# Patient Record
Sex: MALE | Race: WHITE | NOT HISPANIC OR LATINO
[De-identification: names, ages, dates, MRNs, and addresses within clinical notes are randomized per-mention and may not be internally consistent; named-entity substitution may affect disease eponyms.]

---

## 2017-03-08 PROBLEM — Z00.00 ENCOUNTER FOR PREVENTIVE HEALTH EXAMINATION: Status: ACTIVE | Noted: 2017-03-08

## 2017-05-24 ENCOUNTER — APPOINTMENT (OUTPATIENT)
Dept: UROLOGY | Facility: CLINIC | Age: 27
End: 2017-05-24

## 2017-09-23 ENCOUNTER — EMERGENCY (EMERGENCY)
Facility: HOSPITAL | Age: 27
LOS: 0 days | Discharge: HOME | End: 2017-09-23
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
## 2017-09-23 DIAGNOSIS — S50.861A INSECT BITE (NONVENOMOUS) OF RIGHT FOREARM, INITIAL ENCOUNTER: ICD-10-CM
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
## 2017-09-23 DIAGNOSIS — Z88.8 ALLERGY STATUS TO OTHER DRUGS, MEDICAMENTS AND BIOLOGICAL SUBSTANCES STATUS: ICD-10-CM

## 2017-11-22 ENCOUNTER — TRANSCRIPTION ENCOUNTER (OUTPATIENT)
Age: 27
End: 2017-11-22

## 2019-02-15 ENCOUNTER — TRANSCRIPTION ENCOUNTER (OUTPATIENT)
Age: 29
End: 2019-02-15

## 2019-08-14 ENCOUNTER — INPATIENT (INPATIENT)
Facility: HOSPITAL | Age: 29
LOS: 1 days | Discharge: HOME | End: 2019-08-16
Attending: INTERNAL MEDICINE | Admitting: INTERNAL MEDICINE
Payer: OTHER GOVERNMENT

## 2019-08-14 VITALS
DIASTOLIC BLOOD PRESSURE: 91 MMHG | WEIGHT: 188.94 LBS | RESPIRATION RATE: 18 BRPM | TEMPERATURE: 98 F | SYSTOLIC BLOOD PRESSURE: 139 MMHG | OXYGEN SATURATION: 100 % | HEIGHT: 71 IN | HEART RATE: 83 BPM

## 2019-08-14 LAB
ALBUMIN SERPL ELPH-MCNC: 5 G/DL — SIGNIFICANT CHANGE UP (ref 3.5–5.2)
ALP SERPL-CCNC: 81 U/L — SIGNIFICANT CHANGE UP (ref 30–115)
ALT FLD-CCNC: 26 U/L — SIGNIFICANT CHANGE UP (ref 0–41)
ANION GAP SERPL CALC-SCNC: 14 MMOL/L — SIGNIFICANT CHANGE UP (ref 7–14)
APTT BLD: 35.1 SEC — SIGNIFICANT CHANGE UP (ref 27–39.2)
AST SERPL-CCNC: 24 U/L — SIGNIFICANT CHANGE UP (ref 0–41)
BASOPHILS # BLD AUTO: 0.1 K/UL — SIGNIFICANT CHANGE UP (ref 0–0.2)
BASOPHILS NFR BLD AUTO: 1 % — SIGNIFICANT CHANGE UP (ref 0–1)
BILIRUB SERPL-MCNC: 1 MG/DL — SIGNIFICANT CHANGE UP (ref 0.2–1.2)
BUN SERPL-MCNC: 17 MG/DL — SIGNIFICANT CHANGE UP (ref 10–20)
CALCIUM SERPL-MCNC: 9.8 MG/DL — SIGNIFICANT CHANGE UP (ref 8.5–10.1)
CHLORIDE SERPL-SCNC: 100 MMOL/L — SIGNIFICANT CHANGE UP (ref 98–110)
CHOLEST SERPL-MCNC: 208 MG/DL — HIGH (ref 100–200)
CO2 SERPL-SCNC: 23 MMOL/L — SIGNIFICANT CHANGE UP (ref 17–32)
CREAT SERPL-MCNC: 1.1 MG/DL — SIGNIFICANT CHANGE UP (ref 0.7–1.5)
EOSINOPHIL # BLD AUTO: 0.39 K/UL — SIGNIFICANT CHANGE UP (ref 0–0.7)
EOSINOPHIL NFR BLD AUTO: 3.7 % — SIGNIFICANT CHANGE UP (ref 0–8)
GLUCOSE SERPL-MCNC: 109 MG/DL — HIGH (ref 70–99)
HCT VFR BLD CALC: 45.9 % — SIGNIFICANT CHANGE UP (ref 42–52)
HDLC SERPL-MCNC: 72 MG/DL — SIGNIFICANT CHANGE UP
HGB BLD-MCNC: 15 G/DL — SIGNIFICANT CHANGE UP (ref 14–18)
IMM GRANULOCYTES NFR BLD AUTO: 0.3 % — SIGNIFICANT CHANGE UP (ref 0.1–0.3)
INR BLD: 1.07 RATIO — SIGNIFICANT CHANGE UP (ref 0.65–1.3)
LACTATE SERPL-SCNC: 1.3 MMOL/L — SIGNIFICANT CHANGE UP (ref 0.5–2.2)
LIPID PNL WITH DIRECT LDL SERPL: 127 MG/DL — SIGNIFICANT CHANGE UP (ref 4–129)
LYMPHOCYTES # BLD AUTO: 3.55 K/UL — HIGH (ref 1.2–3.4)
LYMPHOCYTES # BLD AUTO: 33.9 % — SIGNIFICANT CHANGE UP (ref 20.5–51.1)
MCHC RBC-ENTMCNC: 28 PG — SIGNIFICANT CHANGE UP (ref 27–31)
MCHC RBC-ENTMCNC: 32.7 G/DL — SIGNIFICANT CHANGE UP (ref 32–37)
MCV RBC AUTO: 85.6 FL — SIGNIFICANT CHANGE UP (ref 80–94)
MONOCYTES # BLD AUTO: 0.95 K/UL — HIGH (ref 0.1–0.6)
MONOCYTES NFR BLD AUTO: 9.1 % — SIGNIFICANT CHANGE UP (ref 1.7–9.3)
NEUTROPHILS # BLD AUTO: 5.44 K/UL — SIGNIFICANT CHANGE UP (ref 1.4–6.5)
NEUTROPHILS NFR BLD AUTO: 52 % — SIGNIFICANT CHANGE UP (ref 42.2–75.2)
NRBC # BLD: 0 /100 WBCS — SIGNIFICANT CHANGE UP (ref 0–0)
PLATELET # BLD AUTO: 251 K/UL — SIGNIFICANT CHANGE UP (ref 130–400)
POTASSIUM SERPL-MCNC: 4.1 MMOL/L — SIGNIFICANT CHANGE UP (ref 3.5–5)
POTASSIUM SERPL-SCNC: 4.1 MMOL/L — SIGNIFICANT CHANGE UP (ref 3.5–5)
PROT SERPL-MCNC: 7.6 G/DL — SIGNIFICANT CHANGE UP (ref 6–8)
PROTHROM AB SERPL-ACNC: 12.3 SEC — SIGNIFICANT CHANGE UP (ref 9.95–12.87)
RBC # BLD: 5.36 M/UL — SIGNIFICANT CHANGE UP (ref 4.7–6.1)
RBC # FLD: 12.6 % — SIGNIFICANT CHANGE UP (ref 11.5–14.5)
SODIUM SERPL-SCNC: 137 MMOL/L — SIGNIFICANT CHANGE UP (ref 135–146)
TOTAL CHOLESTEROL/HDL RATIO MEASUREMENT: 2.9 RATIO — LOW (ref 4–5.5)
TRIGL SERPL-MCNC: 83 MG/DL — SIGNIFICANT CHANGE UP (ref 10–149)
TROPONIN T SERPL-MCNC: <0.01 NG/ML — SIGNIFICANT CHANGE UP
WBC # BLD: 10.46 K/UL — SIGNIFICANT CHANGE UP (ref 4.8–10.8)
WBC # FLD AUTO: 10.46 K/UL — SIGNIFICANT CHANGE UP (ref 4.8–10.8)

## 2019-08-14 PROCEDURE — 70450 CT HEAD/BRAIN W/O DYE: CPT | Mod: 26,59

## 2019-08-14 PROCEDURE — 99053 MED SERV 10PM-8AM 24 HR FAC: CPT

## 2019-08-14 PROCEDURE — 99291 CRITICAL CARE FIRST HOUR: CPT

## 2019-08-14 PROCEDURE — 70496 CT ANGIOGRAPHY HEAD: CPT | Mod: 26

## 2019-08-14 PROCEDURE — 70498 CT ANGIOGRAPHY NECK: CPT | Mod: 26

## 2019-08-14 PROCEDURE — 93010 ELECTROCARDIOGRAM REPORT: CPT

## 2019-08-14 NOTE — ED ADULT TRIAGE NOTE - CHIEF COMPLAINT QUOTE
pt sts " I have been having neck pain for about 3 days and around 9: 30 PM I had numbness of my right arm and face and my speech was slurred". pt denies any numbness or weakness at this time. No slurred speech at this time. pt sts " I have been having neck pain for about 3 days and around 9: 30 PM I had numbness of my right arm and face and my speech was slurred". pt denies any numbness or weakness at this time. No slurred speech at this time. code stroke activated.

## 2019-08-14 NOTE — ED ADULT NURSE NOTE - CHIEF COMPLAINT QUOTE
pt sts " I have been having neck pain for about 3 days and around 9: 30 PM I had numbness of my right arm and face and my speech was slurred". pt denies any numbness or weakness at this time. No slurred speech at this time. code stroke activated.

## 2019-08-14 NOTE — ED PROVIDER NOTE - ATTENDING CONTRIBUTION TO CARE
29m w no PMH reports R sided facial/arm/torso/upper leg loss of sensation and difficulty finding words starting at 9:30p while at work. Numbness has since resolved but wife reports patient still seems occasionally slow to find words. Pt also reporting L sided neck pain for the past few days. No trauma. No use of blood thinners.    Review of Systems  Constitutional:  No fever or chills.   Eyes:  No diplopia, vision loss, eye pain, or discharge.  ENMT:  No nasal congestion, discharge, or throat pain.   Cardiac:  No chest pain, palpitations, syncope, or edema.  Respiratory:  No dyspnea, wheezing, or cough. No hemoptysis.  GI:  No vomiting, diarrhea, or abdominal pain. No melena or hematochezia.  :  No dysuria or hematuria.   Musculoskeletal:  No gait abnormality, joint swelling, joint pain, or back pain. +Neck pain  Skin:  No skin rash, pruritis, jaundice, or lesions.  Neuro:  No headache, dizziness, or focal weakness.  No change in mental status.     Physical Exam  General: Awake, alert, NAD, WDWN, non-toxic appearing, NCAT  Eyes: PERRL 3mm, EOMI, no icterus/nystagmus, lids and conjunctivae are normal  ENT: External inspection normal, pink/moist membranes, pharynx normal  CV: S1S2, regular rate and rhythm, no murmur/gallops/rubs, no JVD, 2+ pulses b/l, no edema/cords/homans, warm/well-perfused  Respiratory: Normal respiratory rate/effort, no respiratory distress, normal voice, speaking full sentences, lungs clear to auscultation b/l, no wheezing/rales/rhonchi, no retractions, no stridor  Abdomen: Soft abdomen, no tender/distended/guarding/rebound, no CVA tender  Musculoskeletal: FROM all 4 extremities, N/V intact  Neck: FROM neck, supple, no meningismus, trachea midline, no JVD, no cspine tender/step-offs  Integumentary: Color normal for race, warm and dry, no rash  Neuro: Oriented x3, no slurred speech, CN 2-12 intact, normal visual fields, 5/5 motor all 4 ext, normal sensory, no extinguishing, normal cerebellar  Psych: Oriented x3, mood normal, affect normal     29m w R sided numbness and difficulty speaking that is improving, starting 9:30p. Stroke code called on arrival. Also w L neck pain. --Labs, EKG, CXR, CT head, CT angio r/o dissection, will d/w Neuro, observe/re-assess. 29m w no PMH reports R sided facial/arm/torso/upper leg loss of sensation and difficulty finding words starting at 9:30p while at work. Numbness has since resolved but wife reports patient still seems occasionally slow to find words. Pt also reporting L sided neck pain for the past few days. No trauma. No use of blood thinners.    Review of Systems  Constitutional:  No fever or chills.   Eyes:  No diplopia, vision loss, eye pain, or discharge.  ENMT:  No nasal congestion, discharge, or throat pain.   Cardiac:  No chest pain, palpitations, syncope, or edema.  Respiratory:  No dyspnea, wheezing, or cough. No hemoptysis.  GI:  No vomiting, diarrhea, or abdominal pain. No melena or hematochezia.  :  No dysuria or hematuria.   Musculoskeletal:  No gait abnormality, joint swelling, joint pain, or back pain. +Neck pain  Skin:  No skin rash, pruritis, jaundice, or lesions.  Neuro:  No headache, dizziness, or focal weakness.  No change in mental status.     Physical Exam  General: Awake, alert, NAD, WDWN, non-toxic appearing, NCAT  Eyes: PERRL 3mm, EOMI, no icterus/nystagmus, lids and conjunctivae are normal  ENT: External inspection normal, pink/moist membranes, pharynx normal  CV: S1S2, regular rate and rhythm, no murmur/gallops/rubs, no JVD, 2+ pulses b/l, no edema/cords/homans, warm/well-perfused  Respiratory: Normal respiratory rate/effort, no respiratory distress, normal voice, speaking full sentences, lungs clear to auscultation b/l, no wheezing/rales/rhonchi, no retractions, no stridor  Abdomen: Soft abdomen, no tender/distended/guarding/rebound, no CVA tender  Musculoskeletal: FROM all 4 extremities, N/V intact  Neck: FROM neck, supple, no meningismus, trachea midline, no JVD, no cspine tender/step-offs, no bruit  Integumentary: Color normal for race, warm and dry, no rash  Neuro: Oriented x3, no slurred speech, CN 2-12 intact, normal visual fields, 5/5 motor all 4 ext, normal sensory, no extinguishing, normal cerebellar  Psych: Oriented x3, mood normal, affect normal     29m w R sided numbness and difficulty speaking that is improving, starting 9:30p. Stroke code called on arrival. Also w L neck pain. --Labs, EKG, CXR, CT head, CT angio r/o dissection, will d/w Neuro, observe/re-assess.

## 2019-08-14 NOTE — ED PROVIDER NOTE - PHYSICAL EXAMINATION
Vital Signs: I have reviewed the initial vital signs.  Constitutional: well-nourished, appears stated age, no acute distress  HEENT: left sided neck ttp along trapezius  Cardiovascular: regular rate, regular rhythm, well-perfused extremities  Respiratory: unlabored respiratory effort, clear to auscultation bilaterally  Gastrointestinal: soft, non-tender abdomen, no pulsatile mass  Musculoskeletal: supple neck, no lower extremity edema  Integumentary: warm, dry, no rash  Neurologic: awake, alert, cranial nerves II-XII grossly intact, extremities’ motor and sensory functions grossly intact. NIHSS 0  Psychiatric: appropriate mood, appropriate affect

## 2019-08-14 NOTE — ED PROVIDER NOTE - CLINICAL SUMMARY MEDICAL DECISION MAKING FREE TEXT BOX
29m w R sided numbness and difficulty speaking that is improving, starting 9:30p. Stroke code called on arrival. Also w L neck pain. Labs, EKG, & imaging reviewed. ASA& Lipitor given. No indication for TPA at this time. Patient admitted to Stroke Unit for Neuro eval & further care/management.

## 2019-08-14 NOTE — ED PROVIDER NOTE - OBJECTIVE STATEMENT
30 y/o m no PMH presents c/o slurred speech approx at 930 pm associated with right face, arm and leg numbness, resolved PTA. Pt believes episode lasted around 30 min. Wife states that he is still slow to respond to questions. denies any trauma, no n/v, no cough, congestion, admits to left sided neck pain for a few days.

## 2019-08-14 NOTE — ED PROVIDER NOTE - NS ED ROS FT
Constitutional: See HPI.  Eyes: No visual changes, eye pain or discharge. No Photophobia  ENMT: No hearing changes, pain, discharge or infections. No neck stiffness. No limited ROM  Cardiac: No SOB or edema. No chest pain with exertion.  Respiratory: No cough or respiratory distress. No hemoptysis. No history of asthma or RAD.  GI: No nausea, vomiting, diarrhea or abdominal pain.  : No dysuria, frequency or burning. No Discharge  MS: No myalgia, muscle weakness, joint pain or back pain.  Neuro: + numbness. No headache or weakness. No LOC.  Skin: No skin rash.  Except as documented in the HPI, all other systems are negative.

## 2019-08-14 NOTE — ED PROVIDER NOTE - CRITICAL CARE PROVIDED
interpretation of diagnostic studies/consultation with other physicians/direct patient care (not related to procedure)/consult w/ pt's family directly relating to pts condition/additional history taking direct patient care (not related to procedure)/interpretation of diagnostic studies/consultation with other physicians/additional history taking/documentation/consult w/ pt's family directly relating to pts condition

## 2019-08-15 ENCOUNTER — TRANSCRIPTION ENCOUNTER (OUTPATIENT)
Age: 29
End: 2019-08-15

## 2019-08-15 LAB
BLD GP AB SCN SERPL QL: SIGNIFICANT CHANGE UP
ESTIMATED AVERAGE GLUCOSE: 105 MG/DL — SIGNIFICANT CHANGE UP (ref 68–114)
HBA1C BLD-MCNC: 5.3 % — SIGNIFICANT CHANGE UP (ref 4–5.6)

## 2019-08-15 PROCEDURE — 70551 MRI BRAIN STEM W/O DYE: CPT | Mod: 26

## 2019-08-15 PROCEDURE — 71045 X-RAY EXAM CHEST 1 VIEW: CPT | Mod: 26

## 2019-08-15 PROCEDURE — 93306 TTE W/DOPPLER COMPLETE: CPT | Mod: 26

## 2019-08-15 PROCEDURE — 99253 IP/OBS CNSLTJ NEW/EST LOW 45: CPT

## 2019-08-15 PROCEDURE — 99223 1ST HOSP IP/OBS HIGH 75: CPT | Mod: AI

## 2019-08-15 PROCEDURE — 93970 EXTREMITY STUDY: CPT | Mod: 26

## 2019-08-15 RX ORDER — ATORVASTATIN CALCIUM 80 MG/1
40 TABLET, FILM COATED ORAL AT BEDTIME
Refills: 0 | Status: DISCONTINUED | OUTPATIENT
Start: 2019-08-15 | End: 2019-08-16

## 2019-08-15 RX ORDER — ENOXAPARIN SODIUM 100 MG/ML
40 INJECTION SUBCUTANEOUS DAILY
Refills: 0 | Status: DISCONTINUED | OUTPATIENT
Start: 2019-08-15 | End: 2019-08-16

## 2019-08-15 RX ORDER — ASPIRIN/CALCIUM CARB/MAGNESIUM 324 MG
1 TABLET ORAL
Qty: 0 | Refills: 0 | DISCHARGE
Start: 2019-08-15

## 2019-08-15 RX ORDER — ATORVASTATIN CALCIUM 80 MG/1
80 TABLET, FILM COATED ORAL ONCE
Refills: 0 | Status: COMPLETED | OUTPATIENT
Start: 2019-08-15 | End: 2019-08-15

## 2019-08-15 RX ORDER — ASPIRIN/CALCIUM CARB/MAGNESIUM 324 MG
81 TABLET ORAL DAILY
Refills: 0 | Status: DISCONTINUED | OUTPATIENT
Start: 2019-08-15 | End: 2019-08-16

## 2019-08-15 RX ORDER — CHLORHEXIDINE GLUCONATE 213 G/1000ML
1 SOLUTION TOPICAL
Refills: 0 | Status: DISCONTINUED | OUTPATIENT
Start: 2019-08-15 | End: 2019-08-16

## 2019-08-15 RX ORDER — ASPIRIN/CALCIUM CARB/MAGNESIUM 324 MG
325 TABLET ORAL ONCE
Refills: 0 | Status: COMPLETED | OUTPATIENT
Start: 2019-08-15 | End: 2019-08-15

## 2019-08-15 RX ADMIN — Medication 325 MILLIGRAM(S): at 01:21

## 2019-08-15 RX ADMIN — ATORVASTATIN CALCIUM 80 MILLIGRAM(S): 80 TABLET, FILM COATED ORAL at 01:21

## 2019-08-15 RX ADMIN — ENOXAPARIN SODIUM 40 MILLIGRAM(S): 100 INJECTION SUBCUTANEOUS at 12:38

## 2019-08-15 RX ADMIN — Medication 2 MILLIGRAM(S): at 11:00

## 2019-08-15 RX ADMIN — Medication 81 MILLIGRAM(S): at 12:38

## 2019-08-15 RX ADMIN — ATORVASTATIN CALCIUM 40 MILLIGRAM(S): 80 TABLET, FILM COATED ORAL at 21:29

## 2019-08-15 NOTE — DISCHARGE NOTE PROVIDER - NSDCFUADDAPPT_GEN_ALL_CORE_FT
Please call and make an appointment with your PMD and neurologist within a week after your discharge from hospital, follow up with them for health assessment and routine screening.

## 2019-08-15 NOTE — DISCHARGE NOTE PROVIDER - HOSPITAL COURSE
Mr. Kendrick is a 29y old male with no past medical history who presented to the ED with a chief complaint of change in speech, right-sided arm numbness, and left sided neck pain (15 Aug 2019 04:35). Patient stated he was sitting in the car when all of a sudden he had left sided neck pain with an associated right upper extremity numbness and tingling. Additionally he had stuttered speech and blurred vision at the time.  When the patient arrived in the ED, stroke code was called and was given a NIHSS =0, as his symptoms resolved prior to arrival.  There, he was given  mg and 80 mg Lipitor and transferred to the Neurology unit. Over the course of his  one-day hospital stay, several imaging studies were ordered such as CXR, echocardiogram, EEG, CT of head, CTA of head and neck with IV contrast, and MRI head and neck with no contrast.  All imaging studies were normal. After one hospital day, the patient was discharged and was asked to follow up with the Neurologist at [facility], Dr. Stewart, in two weeks for further workup. Mr. Kendrick is a 29y old male with no past medical history who presented to the ED with a chief complaint of change in speech, right-sided arm numbness, and left sided neck pain (15 Aug 2019 04:35). Patient stated he was sitting in the car when all of a sudden he had left sided neck pain with an associated right upper extremity numbness and tingling. Additionally he had stuttered speech and blurred vision at the time.  When the patient arrived in the ED, stroke code was called and was given a NIHSS =0, as his symptoms resolved prior to arrival.  There, he was given  mg and 80 mg Lipitor and transferred to the Neurology unit. Over the course of his  one-day hospital stay, several imaging studies were ordered such as CXR, echocardiogram, EEG, CT of head, CTA of head and neck with IV contrast, and MRI head and neck with no contrast.  All imaging studies were normal. Echp was positive for a small non patent PFO. After one hospital day, the patient was discharged and was asked to follow up with the Neurologist at [facility], Dr. Stewart, in two weeks for further workup.

## 2019-08-15 NOTE — CONSULT NOTE ADULT - SUBJECTIVE AND OBJECTIVE BOX
HPI:  30 y/o left handed man with no PMH presents c/o expressive aphasia approx 4 hours prior to presentation accompanied by right face, arm and leg numbness, all of which have resolved. Wife states that he is still slow to respond to questions.    PAST MEDICAL & SURGICAL HISTORY:  No pertinent past medical history  No significant past surgical history    Home Medications:  none    Vital Signs Last 24 Hrs  T(C): 36.9 (15 Aug 2019 00:00), Max: 37.1 (14 Aug 2019 23:10)  T(F): 98.5 (15 Aug 2019 00:00), Max: 98.7 (14 Aug 2019 23:10)  HR: 82 (15 Aug 2019 00:00) (80 - 92)  BP: 123/76 (15 Aug 2019 00:00) (119/76 - 139/91)  BP(mean): 94 (15 Aug 2019 00:00) (94 - 106)  RR: 18 (15 Aug 2019 00:00) (15 - 18)  SpO2: 99% (15 Aug 2019 00:00) (99% - 100%)    NIH Stroke Scale:   · NIH Stroke Scale: LOC	(0) Alert; keenly responsive	  · NIH Stroke Scale: LOC Question	(0) Answers both questions correctly	  · NIH Stroke Scale: LOC Command	(0) Performs both tasks correctly	  · NIH Stroke Scale: Gaze	(0) Normal	  · NIH Stroke Scale: Visual	(0) No visual loss	  · NIH Stroke Scale: Facial	(0) Normal symmetrical movements	  · NIH Stroke Scale: Arm Left	(0) No drift; limb holds 90 (or 45) degrees for full 10 secs	  · NIH Stroke Scale: Arm Right	(0) No drift; limb holds 90 (or 45) degrees for full 10 secs	  · NIH Stroke Scale: Leg Left	(0) No drift; leg holds 30 degree position for full 5 secs	  · NIH Stroke Scale: Leg Right	(0) No drift; leg holds 30 degree position for full 5 secs	  · NIH Stroke Scale: Ataxia	(0) Absent	  · NIH Stroke Scale: Sensory	(0) Normal; no sensory loss	  · NIH Stroke Scale: Language	(0) No aphasia; normal	  · NIH Stroke Scale: Dysarthria	(0) Normal	  · NIH Stroke Scale: Extinct Inattention	(0) No abnormality	  · NIH Stroke Scale: Total	0	    MRS: Zero    Allergies    No Known Allergies    Intolerances      MEDICATIONS  (STANDING):    MEDICATIONS  (PRN):      LABS:                        15.0   10.46 )-----------( 251      ( 14 Aug 2019 23:07 )             45.9     08-14    137  |  100  |  17  ----------------------------<  109<H>  4.1   |  23  |  1.1    Ca    9.8      14 Aug 2019 23:07    TPro  7.6  /  Alb  5.0  /  TBili  1.0  /  DBili  x   /  AST  24  /  ALT  26  /  AlkPhos  81  08-14    PT/INR - ( 14 Aug 2019 23:07 )   PT: 12.30 sec;   INR: 1.07 ratio         PTT - ( 14 Aug 2019 23:07 )  PTT:35.1 sec    < from: CT Angio Neck w/ IV Cont (08.14.19 @ 23:52) >    Procedure: Multiaxial CTA images of the neck and brain performed   following the administration of intravenous contrast. Coronal and   sagittal reformatted images as well as maximal intensity projection   images and 3-D reconstructions provided for comparison.    Findings:  Included aortic arch is unremarkable. Origins of the great vessels are   unremarkable.    Bilateral common carotid arteriesare normally patent. Region of the   bilateral carotid bifurcation is unremarkable. There is a normal   branching pattern of the bilateral ECA. Distal cervical internal carotid   arteries are normally patent.    M1 and M2 branches of the bilateral MCA as well as A1 and A2 branches of   the bilateral SONALI are normally patent.    Origins of the bilateral vertebral arteries are normally patent.   Vertebral arteries are normally patent. Basilar artery is normally   patent. Bilateral SCA and PCA vessels are normally visualized.    No focal stenosis or aneurysm identified at this time.    Mild mucosal thickening of the bilateral maxillary and right frontal   sinuses.    No acute osseous abnormality.    Impression:  Essentially unremarkable CTA of the neck and brain-no focal stenosis or   aneurysm identified at this time    Mild mucosal thickening of the bilateral maxillary and right frontal   sinuses.    < end of copied text >    < from: CT Brain Stroke Protocol (08.14.19 @ 23:30) >  The cortical sulci and ventricular system are  symmetrical and consistent   with the patient's age.      No acute intracranial hemorrhage. No mass effect, midline shift, or extra   axial  fluid collection.     Gray-white differentiation is maintained.     The bones are intact without depressed skull fracture.     Trace fluid in the right front sinus    IMPRESSION:  No CT evidence of acute intracranial pathology.    < end of copied text > HPI:  30 y/o left handed man with no PMH presents c/o expressive aphasia approx 4 hours prior to presentation accompanied by right face, arm and leg numbness, all of which have resolved. Pt believes episode lasted around 30 min. Wife states that he is still slow to respond to questions.    PAST MEDICAL & SURGICAL HISTORY:  No pertinent past medical history  No significant past surgical history    Home Medications:  none    Vital Signs Last 24 Hrs  T(C): 36.9 (15 Aug 2019 00:00), Max: 37.1 (14 Aug 2019 23:10)  T(F): 98.5 (15 Aug 2019 00:00), Max: 98.7 (14 Aug 2019 23:10)  HR: 82 (15 Aug 2019 00:00) (80 - 92)  BP: 123/76 (15 Aug 2019 00:00) (119/76 - 139/91)  BP(mean): 94 (15 Aug 2019 00:00) (94 - 106)  RR: 18 (15 Aug 2019 00:00) (15 - 18)  SpO2: 99% (15 Aug 2019 00:00) (99% - 100%)    NIH Stroke Scale:   · NIH Stroke Scale: LOC	(0) Alert; keenly responsive	  · NIH Stroke Scale: LOC Question	(0) Answers both questions correctly	  · NIH Stroke Scale: LOC Command	(0) Performs both tasks correctly	  · NIH Stroke Scale: Gaze	(0) Normal	  · NIH Stroke Scale: Visual	(0) No visual loss	  · NIH Stroke Scale: Facial	(0) Normal symmetrical movements	  · NIH Stroke Scale: Arm Left	(0) No drift; limb holds 90 (or 45) degrees for full 10 secs	  · NIH Stroke Scale: Arm Right	(0) No drift; limb holds 90 (or 45) degrees for full 10 secs	  · NIH Stroke Scale: Leg Left	(0) No drift; leg holds 30 degree position for full 5 secs	  · NIH Stroke Scale: Leg Right	(0) No drift; leg holds 30 degree position for full 5 secs	  · NIH Stroke Scale: Ataxia	(0) Absent	  · NIH Stroke Scale: Sensory	(0) Normal; no sensory loss	  · NIH Stroke Scale: Language	(0) No aphasia; normal	  · NIH Stroke Scale: Dysarthria	(0) Normal	  · NIH Stroke Scale: Extinct Inattention	(0) No abnormality	  · NIH Stroke Scale: Total	0	    ABCD2: 2  MRS: Zero    Allergies    No Known Allergies    Intolerances      MEDICATIONS  (STANDING):    MEDICATIONS  (PRN):      LABS:                        15.0   10.46 )-----------( 251      ( 14 Aug 2019 23:07 )             45.9     08-14    137  |  100  |  17  ----------------------------<  109<H>  4.1   |  23  |  1.1    Ca    9.8      14 Aug 2019 23:07    TPro  7.6  /  Alb  5.0  /  TBili  1.0  /  DBili  x   /  AST  24  /  ALT  26  /  AlkPhos  81  08-14    PT/INR - ( 14 Aug 2019 23:07 )   PT: 12.30 sec;   INR: 1.07 ratio         PTT - ( 14 Aug 2019 23:07 )  PTT:35.1 sec    < from: CT Angio Neck w/ IV Cont (08.14.19 @ 23:52) >    Procedure: Multiaxial CTA images of the neck and brain performed   following the administration of intravenous contrast. Coronal and   sagittal reformatted images as well as maximal intensity projection   images and 3-D reconstructions provided for comparison.    Findings:  Included aortic arch is unremarkable. Origins of the great vessels are   unremarkable.    Bilateral common carotid arteriesare normally patent. Region of the   bilateral carotid bifurcation is unremarkable. There is a normal   branching pattern of the bilateral ECA. Distal cervical internal carotid   arteries are normally patent.    M1 and M2 branches of the bilateral MCA as well as A1 and A2 branches of   the bilateral SONALI are normally patent.    Origins of the bilateral vertebral arteries are normally patent.   Vertebral arteries are normally patent. Basilar artery is normally   patent. Bilateral SCA and PCA vessels are normally visualized.    No focal stenosis or aneurysm identified at this time.    Mild mucosal thickening of the bilateral maxillary and right frontal   sinuses.    No acute osseous abnormality.    Impression:  Essentially unremarkable CTA of the neck and brain-no focal stenosis or   aneurysm identified at this time    Mild mucosal thickening of the bilateral maxillary and right frontal   sinuses.    < end of copied text >    < from: CT Brain Stroke Protocol (08.14.19 @ 23:30) >  The cortical sulci and ventricular system are  symmetrical and consistent   with the patient's age.      No acute intracranial hemorrhage. No mass effect, midline shift, or extra   axial  fluid collection.     Gray-white differentiation is maintained.     The bones are intact without depressed skull fracture.     Trace fluid in the right front sinus    IMPRESSION:  No CT evidence of acute intracranial pathology.    < end of copied text >

## 2019-08-15 NOTE — DISCHARGE NOTE PROVIDER - CARE PROVIDERS DIRECT ADDRESSES
,DirectAddress_Unknown,[email or web address] ,[email or web address],DirectAddress_Unknown

## 2019-08-15 NOTE — H&P ADULT - NSHPLABSRESULTS_GEN_ALL_CORE
15.0   10.46 )-----------( 251      ( 14 Aug 2019 23:07 )             45.9   \  08-14    137  |  100  |  17  ----------------------------<  109<H>  4.1   |  23  |  1.1    Ca    9.8      14 Aug 2019 23:07    TPro  7.6  /  Alb  5.0  /  TBili  1.0  /  DBili  x   /  AST  24  /  ALT  26  /  AlkPhos  81  08-14    < from: CT Angio Neck w/ IV Cont (08.14.19 @ 23:52) >    ssentially unremarkable CTA of the neck and brain-no focal stenosis or   aneurysm identified at this time    Mild mucosal thickening of the bilateral maxillary and right frontal   sinuses.            < end of copied text >    < from: CT Angio Head w/ IV Cont (08.14.19 @ 23:52) >        < end of copied text >

## 2019-08-15 NOTE — DISCHARGE NOTE PROVIDER - PROVIDER TOKENS
FREE:[LAST:[Michael],FIRST:[Kristin BARRON],PHONE:[phone]],FAX:[(   )    -],ADDRESS:[Dr. Kristin Rodriguez MD  [address] [place] [address]  Phone: [phone]],FOLLOWUP:[2 weeks]],PROVIDER:[TOKEN:[[id number]],FOLLOWUP:[2 weeks]] PROVIDER:[TOKEN:[[id number]],FOLLOWUP:[2 weeks]],FREE:[LAST:[Michael],FIRST:[Kristin BARRON],PHONE:[phone]],FAX:[(   )    -],ADDRESS:[Dr. Kristin Rodriguez MD  [facility] [address], [place] [address]  Phone: [phone]],FOLLOWUP:[1 week]]

## 2019-08-15 NOTE — CONSULT NOTE ADULT - ASSESSMENT
Impression:  28 y/o left handed man with no PMH presents c/o expressive aphasia approx 4 hours prior to presentation accompanied by right face, arm and leg numbness, all of which have resolved.     Plan:  Admit to stroke unit, call code stroke for any increase in NIHSS  MRI brain without esme if no contraindication  2d echo  lipid panel  REEG   mg now x 1 then 81 mg QD  Lipitor 80 mg now then QD Impression:  30 y/o left handed man with no PMH presents c/o expressive aphasia approx 4 hours prior to presentation accompanied by right face, arm and leg numbness, all of which have resolved.     Plan:  Admit to stroke unit, call code stroke for any increase in NIHSS  MRI brain without esme if no contraindication; MRI neck T1 Fat suppresion   2d echo  lipid panel, hypercoagulable panel  REEG   mg now x 1 then 81 mg QD  Lipitor 80 mg now then QD

## 2019-08-15 NOTE — MEDICAL STUDENT PROGRESS NOTE(EDUCATION) - SUBJECTIVE AND OBJECTIVE BOX
MEG ZULETA  29y  Male      Patient is a 29y old male with no PMH who presents with a chief complaint of change in speech, right-sided arm numbness, and left sided neck pain (15 Aug 2019 04:35). Patient stated he was sitting in his car last night () at 9:30pm when all of a sudden he felt left-sided neck pain, starting at the base of his skull, with an associated numbness and tingling in his right arm.  Shortly after, he had trouble speaking and described this as "stuttering" and difficult getting his words out. Patient admits to having history of tingling and numbness with neck pain before, which he attributed to his work vest possibly pinching a nerve; however he's never had the speech symptoms before.  Additionally patient admitted to difficulty swallowing in that he felt esophageal muscle spasms and that it caused discomfort but was not painful. Patient also admitted to having blurred vision at the time.  This episode lasted 30-60 mins and no symptoms on presentation to ED. Patient denies headaches, trauma, chest pain, SOB, LOC, nausea, vomiting, dizziness and loss bowel/bladder control. He denies any heavy lifting or strenuous activity and works as a  for 10 years. Patient denies any history of stroke, seizure, HTN, DM, DLD or heart disease.    In the ED, stroke code was called and received NIHSS =0.  Patient was given  mg    INTERVAL HPI/OVERNIGHT EVENTS:  No overnight events. Patient has no complaints and there are no symptoms.     REVIEW OF SYSTEMS:  CONSTITUTIONAL: No fever, weight loss, or fatigue  EYES: denies visual disturbances, or discharge  ENMT:  No difficulty hearing, tinnitus, vertigo; No sinus or throat pain  NECK: admits to slight left pain, denies stiffness  RESPIRATORY:  admits to cough 1x day, denies wheezing, chills or hemoptysis; No shortness of breath  CARDIOVASCULAR: No chest pain, palpitations, dizziness,   GASTROINTESTINAL: No abdominal or epigastric pain. No diarrhea or constipation. No nausea, vomiting, or hematemesis. No melena or hematochezia.  GENITOURINARY: No incontinence  NEUROLOGICAL: No headaches, memory loss, loss of strength, numbness, or tremors  MUSCULOSKELETAL: No joint pain or swelling; No muscle, back, or extremity pain      Vital Signs Last 24 Hrs  T(C): 35.7 (15 Aug 2019 05:44), Max: 37.1 (14 Aug 2019 23:10)  T(F): 96.3 (15 Aug 2019 05:44), Max: 98.8 (15 Aug 2019 00:30)  HR: 73 (15 Aug 2019 11:00) (72 - 92)  BP: 126/78 (15 Aug 2019 11:00) (111/75 - 139/91)  BP(mean): 89 (15 Aug 2019 02:00) (89 - 106)  RR: 18 (15 Aug 2019 05:44) (15 - 18)  SpO2: 99% (15 Aug 2019 02:00) (99% - 100%)    PHYSICAL EXAM:  GENERAL: NAD, well-groomed, well-developed  HEAD:  Atraumatic, Normocephalic  EYES: EOMI, PERRLA, conjunctiva and sclera clear  ENMT: Moist mucous membranes, Good dentition, No lesions  NECK: Supple, No JVD, Normal thyroid  NERVOUS SYSTEM:  Alert & Oriented X3, Good concentration; Motor Strength 5/5 B/L upper and lower extremities; DTRs 2+ intact and symmetric  CHEST/LUNG: Clear to auscultation bilaterally; No rales, rhonchi, wheezing, or rubs  HEART: Regular rate and rhythm; No murmurs, rubs, or gallops  ABDOMEN: Soft, Nontender, Nondistended; Bowel sounds present  EXTREMITIES:  2+ Peripheral Pulses, No clubbing, cyanosis, or edema      Consultant(s) Notes Reviewed:  [x ] YES  [ ] NO  Care Discussed with Consultants/Other Providers [ x] YES  [ ] NO    LABS:                        15.0   10.46 )-----------( 251      ( 14 Aug 2019 23:07 )             45.9     08-14    137  |  100  |  17  ----------------------------<  109<H>  4.1   |  23  |  1.1    Ca    9.8      14 Aug 2019 23:07    TPro  7.6  /  Alb  5.0  /  TBili  1.0  /  DBili  x   /  AST  24  /  ALT  26  /  AlkPhos  81  08-14    PT/INR - ( 14 Aug 2019 23:07 )   PT: 12.30 sec;   INR: 1.07 ratio         PTT - ( 14 Aug 2019 23:07 )  PTT:35.1 sec      CAPILLARY BLOOD GLUCOSE  99 (15 Aug 2019 00:02)      POCT Blood Glucose.: 99 mg/dL (14 Aug 2019 23:04)      RADIOLOGY & ADDITIONAL TESTS:  CT angio: negative for stenosis  CT: head: negative  CXRAY: negative  HbA1C: pending  Lipid profile: cholesterol: 208 (elevated), T, HDL: 72, LDL:127  Imaging Personally Reviewed:  [ ] YES  [ ] NO MEG ZULETA  29y  Male      Patient is a 29y old male with no PMH who presents with a chief complaint of change in speech, right-sided arm numbness, and left sided neck pain (15 Aug 2019 04:35). Patient stated he was sitting in his car last night () at 9:30pm when all of a sudden he felt left-sided neck pain, starting at the base of his skull, with an associated numbness and tingling in his right arm.  Shortly after, he had trouble speaking and described this as "stuttering" and difficult getting his words out. Patient admits to having history of tingling and numbness with neck pain before, which he attributed to his work vest possibly pinching a nerve; however he's never had the speech symptoms before.  Additionally patient admitted to difficulty swallowing in that he felt esophageal muscle spasms and that it caused discomfort but was not painful. Patient also admitted to having blurred vision at the time.  This episode lasted 30-60 mins and no symptoms on presentation to ED. Patient denies headaches, trauma, chest pain, SOB, LOC, nausea, vomiting, dizziness and loss bowel/bladder control. He denies any heavy lifting or strenuous activity and works as a  for 10 years. Patient denies any history of stroke, seizure, HTN, DM, DLD or heart disease.    In the ED, stroke code was called and received NIHSS =0.  Patient was given  mg    INTERVAL HPI/OVERNIGHT EVENTS:  No overnight events. Patient has no complaints and there are no symptoms.     REVIEW OF SYSTEMS:  CONSTITUTIONAL: No fever, weight loss, or fatigue  EYES: denies visual disturbances, or discharge  ENMT:  No difficulty hearing, tinnitus, vertigo; No sinus or throat pain  NECK: admits to slight left pain, denies stiffness  RESPIRATORY:  admits to cough 1x day, denies wheezing, chills or hemoptysis; No shortness of breath  CARDIOVASCULAR: No chest pain, palpitations, dizziness,   GASTROINTESTINAL: No abdominal or epigastric pain. No diarrhea or constipation. No nausea, vomiting, or hematemesis. No melena or hematochezia.  GENITOURINARY: No incontinence  NEUROLOGICAL: No headaches, memory loss, loss of strength, numbness, or tremors  MUSCULOSKELETAL: No joint pain or swelling; No muscle, back, or extremity pain      Vital Signs Last 24 Hrs  T(C): 35.7 (15 Aug 2019 05:44), Max: 37.1 (14 Aug 2019 23:10)  T(F): 96.3 (15 Aug 2019 05:44), Max: 98.8 (15 Aug 2019 00:30)  HR: 73 (15 Aug 2019 11:00) (72 - 92)  BP: 126/78 (15 Aug 2019 11:00) (111/75 - 139/91)  BP(mean): 89 (15 Aug 2019 02:00) (89 - 106)  RR: 18 (15 Aug 2019 05:44) (15 - 18)  SpO2: 99% (15 Aug 2019 02:00) (99% - 100%)    PHYSICAL EXAM:  GENERAL: NAD, well-groomed, well-developed  HEAD:  Atraumatic, Normocephalic  EYES: EOMI, PERRLA, conjunctiva and sclera clear  ENMT: Moist mucous membranes, Good dentition, No lesions  NECK: Supple, No JVD, Normal thyroid  NERVOUS SYSTEM:  Alert & Oriented X3, Good concentration; Motor Strength 5/5 B/L upper and lower extremities; DTRs 2+ intact and symmetric  CHEST/LUNG: Clear to auscultation bilaterally; No rales, rhonchi, wheezing, or rubs  HEART: Regular rate and rhythm; No murmurs, rubs, or gallops  ABDOMEN: Soft, Nontender, Nondistended; Bowel sounds present  EXTREMITIES:  2+ Peripheral Pulses, No clubbing, cyanosis, or edema      Consultant(s) Notes Reviewed:  [x ] YES  [ ] NO  Care Discussed with Consultants/Other Providers [ x] YES  [ ] NO    LABS:                        15.0   10.46 )-----------( 251      ( 14 Aug 2019 23:07 )             45.9     08-14    137  |  100  |  17  ----------------------------<  109<H>  4.1   |  23  |  1.1    Ca    9.8      14 Aug 2019 23:07    TPro  7.6  /  Alb  5.0  /  TBili  1.0  /  DBili  x   /  AST  24  /  ALT  26  /  AlkPhos  81  08-14    PT/INR - ( 14 Aug 2019 23:07 )   PT: 12.30 sec;   INR: 1.07 ratio         PTT - ( 14 Aug 2019 23:07 )  PTT:35.1 sec      CAPILLARY BLOOD GLUCOSE  99 (15 Aug 2019 00:02)      POCT Blood Glucose.: 99 mg/dL (14 Aug 2019 23:04)      RADIOLOGY & ADDITIONAL TESTS:  CT angio: negative for stenosis  CT: head: negative  CXRAY: negative  HbA1C: pending  Lipid profile: cholesterol: 208 (elevated), T, HDL: 72, LDL:127    Imaging Personally Reviewed:  [ ] YES  [ ] NO

## 2019-08-15 NOTE — CONSULT NOTE ADULT - ATTENDING COMMENTS
Patient seen and examined and agree with above except as noted.  Patient had episode of difficulty expressing himself with right sided parestheisas.  Symptoms lasted about 30 minutes  Currently at baseline NIHSS 0, mrankin 0  NO trauma, Patient adopted and doesn't know family history  No smoking drinking or drug use    Plan as above

## 2019-08-15 NOTE — DISCHARGE NOTE PROVIDER - NSDCFUADDINST_GEN_ALL_CORE_FT
Diet, regular as tolerated.   Please call and make an appointment with your PMD and neurologist within a week after your discharge from hospital, follow up with them for health assessment and routine screening.

## 2019-08-15 NOTE — H&P ADULT - ATTENDING COMMENTS
29 y/m with no PMH presents with chief complain of difficulty speaking that started around 9 pm yesterday, describes as "stuttering and delayed speech" lasted for around 30-60 minutes, he also had numbness of the right side of the body, above the level of hip, no weakness, had feeling of soreness of throat but no swallowing difficulty, no focal weakness, felt his vision was somewhat changed during the episode but cannot tell what exactly, denies double vision, no facial deviation, no chest pain, belly pain, shortness of breath, no change in bowel or bladder habits, fever, cough, belly pain. This episode lasted around 30-60 minutes, no symptoms on presentation to ED. He denies any history of stroke or similar episode in the past, except for occasional tingling.    # Transient dysarthria and Right sided numbness:    Given the age and no significant PMH, less likely to be stroke. Family history unknown as he is adopted  WIll admit to stroke unit. Rule out stroke/TIA  CT angio: Negative for any stenosis, CT head: negative  Will get MRI brain, ECHO, Hba1c, lipid profile.  REEG to r/o seizure as cause  s/p asprin 325 and statin 80, c/w aspirin 81 and statin 40 mg    # Diet:  No difficulty swallowing  Regular diet    # Activity:  As tolerated    # DVT ppx:  lovenox    # Full code 29 y/m with no PMH presents with chief complain of difficulty speaking that started around 9 pm yesterday while watching soccer with friend, describes as "stuttering and delayed speech" lasted for around 30 minutes, he also had numbness of the right side of the body, above the level of hip, no weakness, had feeling of soreness of throat but no swallowing difficulty, no focal weakness, felt his vision was somewhat changed during the episode but cannot tell what exactly, denies double vision, no facial deviation, no chest pain, belly pain, shortness of breath, no change in bowel or bladder habits, fever, cough, belly pain. This episode lasted around 30minutes, no symptoms on presentation to ED. He denies any history of stroke or similar episode in the past, except for occasional tingling.    # Transient dysarthria and Right sided numbness: R/o CVA, r/o dissection  CT angio: Negative for any stenosis, CT head: negative  Will get MRI brain, ECHO, Hba1c, lipid profile.  REEG to r/o seizure as cause  s/p asprin 325 and statin 80, c/w aspirin 81 and statin 40 mg    # Diet:  No difficulty swallowing  Regular diet    # Activity:  As tolerated    # DVT ppx:  lovenox    # Full code 29 y/m with no PMH presents with chief complain of difficulty speaking that started around 9 pm yesterday while watching soccer with friend, describes as "stuttering and delayed speech" lasted for around 30 minutes, he also had numbness of the right side of the body, above the level of hip, no weakness, had feeling of soreness of throat but no swallowing difficulty, no focal weakness, felt his vision was somewhat changed during the episode but cannot tell what exactly, denies double vision, no facial deviation. This episode lasted around 30minutes, no symptoms on presentation to ED. He denies any history of stroke or similar episode in the past, except for occasional tingling.    Denies CP, SOB, N/V/D/C/AP, cough, F, chills, dizziness, new focal weakness, HA, dysuria, or urinary symptoms, blood in stool.    ROS: all systems unremarkable except as above.     Gen: NAD, AA0x3  HEENT: PERRLA, EOM, no LAD  CV: nl S1 S2  Resp: decreased BS b/l  GI: NT/ND/S +BS  MS: no c/c/e  Neuro: nonfocal    12 Lead ECG:   Ventricular Rate 81 BPM  Atrial Rate 81 BPM  Diagnosis Line Normal sinus rhythm  Normal ECG    # Transient dysarthria and Right sided numbness: R/o CVA, r/o dissection vs cervical pathology vs complicated migraine  CT angio: Negative for any stenosis, CT head: negative  Will get MRI brain, ECHO, Hba1c, lipid profile.  REEG to r/o seizure as cause  s/p aspirin 325 and statin 80, c/w aspirin 81 and statin 40 mg    # Diet:  No difficulty swallowing  Regular diet    # Activity:  As tolerated    # DVT ppx:  lovenox    # Full code    Addendum:  MRI negative for acute CVA, EEG negative for seizures. D/w neuro, if echo ok, may be d/c'ed home with outpt f/u. D/w pt's spouse. Verbalized understanding of instructions. Suspect complicated migraine as etiology of pt's symptoms. 29 y/m with no PMH presents with chief complain of difficulty speaking that started around 9 pm yesterday while watching soccer with friend, describes as "stuttering and delayed speech" lasted for around 30 minutes, he also had numbness of the right side of the body, above the level of hip, no weakness, had feeling of soreness of throat but no swallowing difficulty, no focal weakness, felt his vision was somewhat changed during the episode but cannot tell what exactly, denies double vision, no facial deviation. This episode lasted around 30minutes, no symptoms on presentation to ED. He denies any history of stroke or similar episode in the past, except for occasional tingling.    Denies CP, SOB, N/V/D/C/AP, cough, F, chills, dizziness, new focal weakness, HA, dysuria, or urinary symptoms, blood in stool.    ROS: all systems unremarkable except as above.     Gen: NAD, AA0x3  HEENT: PERRLA, EOM, no LAD  CV: nl S1 S2  Resp: decreased BS b/l  GI: NT/ND/S +BS  MS: no c/c/e  Neuro: nonfocal    12 Lead ECG:   Ventricular Rate 81 BPM  Atrial Rate 81 BPM  Diagnosis Line Normal sinus rhythm  Normal ECG    # Transient dysarthria and Right sided numbness: R/o CVA, r/o dissection vs cervical pathology vs complicated migraine  CT angio: Negative for any stenosis, CT head: negative  Will get MRI brain, ECHO, Hba1c, lipid profile.  REEG to r/o seizure as cause  s/p aspirin 325 and statin 80, c/w aspirin 81 and statin 40 mg    # Diet:  No difficulty swallowing  Regular diet    # Activity:  As tolerated    # DVT ppx:  lovenox    # Full code    Addendum:  MRI negative for acute CVA, EEG negative for seizures. D/w neuro, if echo ok, may be d/c'ed home with outpt f/u. D/w pt's spouse. Verbalized understanding of instructions. Suspect complicated migraine as etiology of pt's symptoms.  Addendum #2. Echo came back with small PFO and bidirectional shunt. D/w neuro - will get LE doppler and hypercoag panel

## 2019-08-15 NOTE — DISCHARGE NOTE PROVIDER - NSDCCPCAREPLAN_GEN_ALL_CORE_FT
PRINCIPAL DISCHARGE DIAGNOSIS  Diagnosis: TIA (transient ischemic attack)  Assessment and Plan of Treatment: You presenyted to ER with transient weakness and difficulty in speech and right sided numbness. The episode lasted around 30-60 minutes and then resolved. Imaging was done to rule out acute cerebral pathology. The workup has been negative. There is no residual weakness and symptoms left. PLease follow up with neurologist Jose Antonio outpatient after discharge for monitoring and neurological assessment. PRINCIPAL DISCHARGE DIAGNOSIS  Diagnosis: Aphasia  Assessment and Plan of Treatment: You presenyted to ER with transient difficulty in speech and right sided numbness. MRI and EEG were negative. PLease follow up with neurologist Jose Antonio outpatient after discharge for monitoring and neurological assessment. Possible etiology of symptoms is complicated migraine or seizure. PRINCIPAL DISCHARGE DIAGNOSIS  Diagnosis: Aphasia  Assessment and Plan of Treatment: You presented to ER with transient difficulty in speech and right sided numbness. MRI and EEG were negative. PLease follow up with neurologist Jose Antonio outpatient after discharge for monitoring and neurological assessment. Possible etiology of symptoms is complicated migraine or seizure. PRINCIPAL DISCHARGE DIAGNOSIS  Diagnosis: Aphasia  Assessment and Plan of Treatment: You presented to ER with transient difficulty in speech and right sided numbness. MRI and EEG were negative. Echo showed a small patent foramen ovale with biderectional shunt. PLease follow up with neurologist Jose Antonio outpatient after discharge for monitoring and neurological assessment. Neurologist will have results of hypercoaguable and decide on the need for transesophageal echocardiogram. Possible etiology of symptoms is due to TIA, complicated migraine or seizure.

## 2019-08-15 NOTE — CHART NOTE - NSCHARTNOTEFT_GEN_A_CORE
<<<RESIDENT DISCHARGE NOTE>>>     MEG ZULETA  MRN-[id number]    VITAL SIGNS:  T(F): 97.5 (08-15-19 @ 15:49), Max: 98.8 (08-15-19 @ 00:30)  HR: 82 (08-15-19 @ 15:49)  BP: 118/70 (08-15-19 @ 15:49)  SpO2: 98% (08-15-19 @ 15:49)  Weight (kg): 86.5 (08-15-19 @ 05:44)  BMI (kg/m2): 26.6 (08-15-19 @ 05:44)    PHYSICAL EXAMINATION:  General: NAD  Head & Neck: NCAT  Pulmonary: CTA b/l  Cardiovascular: +s1s2 RRR  Gastrointestinal/Abdomen & Pelvis: soft, NT/ND (+) bs  Neurologic/Motor: FROM x 4 5/5    TEST RESULTS:                        15.0   10.46 )-----------( 251      ( 14 Aug 2019 23:07 )             45.9       08-14    137  |  100  |  17  ----------------------------<  109<H>  4.1   |  23  |  1.1    Ca    9.8      14 Aug 2019 23:07    TPro  7.6  /  Alb  5.0  /  TBili  1.0  /  DBili  x   /  AST  24  /  ALT  26  /  AlkPhos  81  08-14      FINAL DISCHARGE INTERVIEW:  Resident(s) Present: (Name: Liz RN Present: (Name: Nina)    DISCHARGE MEDICATION RECONCILIATION  reviewed with Attending (Name:Dr. Mayo)    DISPOSITION:   [  ] Home,    [  ] Home with Visiting Nursing Services,   [    ]  SNF/ NH,    [   ] Acute Rehab (4A),   [   ] Other (Specify:)

## 2019-08-15 NOTE — H&P ADULT - NSICDXFAMILYHX_GEN_ALL_CORE_FT
FAMILY HISTORY:  No pertinent family history in first degree relatives, ADOPTED, UNKNOWN OF ANY FAMILY HISTORY

## 2019-08-15 NOTE — DISCHARGE NOTE PROVIDER - CARE PROVIDER_API CALL
Kristin Rodriguez Dr., MD  [address], [place] [address]  Phone: [phone]  Phone: [phone]  Fax: (   )    -  Follow Up Time: 2 weeks    Moiz Roque)  EEGEpilepsy; Neurology  [address] [place] [address], [address]  [place] [address]  Phone: [phone]  Fax: [phone]  Follow Up Time: 2 weeks Moiz Roque)  EEGEpilepsy; Neurology  [address] [place] [address], [address]  [place] [address]  Phone: [phone]  Fax: [phone]  Follow Up Time: 2 weeks    Kristin Rodriguez Dr., MD  [address], [place] [address]  Phone: [phone]  Phone: [phone]  Fax: (   )    -  Follow Up Time: 1 week Moiz Roque)  EEGEpilepsy; Neurology  [address] [place] [address], [address]  [place] [address]  Phone: [phone]  Fax: [phone]  Follow Up Time: 2 weeks    Kristin Rodriguez Dr., MD  [address], [place] [address]  Phone: [phone]  Phone: [phone]  Fax: (   )    -  Follow Up Time: 1 week

## 2019-08-15 NOTE — H&P ADULT - NSHPPHYSICALEXAM_GEN_ALL_CORE
T(C): 37 (08-15-19 @ 02:00), Max: 37.1 (08-14-19 @ 23:10)  HR: 89 (08-15-19 @ 02:00) (72 - 92)  BP: 116/73 (08-15-19 @ 02:00) (111/75 - 139/91)  RR: 18 (08-15-19 @ 02:00) (15 - 18)  SpO2: 99% (08-15-19 @ 02:00) (99% - 100%)  PHYSICAL EXAM:    Constitutional: Lying in bed no acute distress    Eyes: PERLAA    ENMT: No facial deviation    Neck: No mass/ JVD    Respiratory: B/l clear, no crackles, wheeze    Cardiovascular: Regular rate and rhythm, no murmur    Gastrointestinal: soft non tender, no organomegaly    Extremities: No edema, B/l lower ext    Neurological: AO x 3,   Power 5/5 in b/l upper and lower ext  Sensation: Intact

## 2019-08-15 NOTE — MEDICAL STUDENT PROGRESS NOTE(EDUCATION) - NS MD HP STUD ASPLAN ASSES FT
Patient is a 29y old male with no PMH who presents with a chief complaint of change in speech with an associated right-sided arm numbness, and left sided neck pain . He was admitted for TIA.

## 2019-08-15 NOTE — MEDICAL STUDENT PROGRESS NOTE(EDUCATION) - NS MD HP STUD ASPLAN PLAN FT
#Transient stuttered speech and right sided numbness: TIA vs seizure vs stroke vs artery dissection  -most common cause of stroke/TIA in young adults is artery dissection, so want to rule out with MRI  -no significant PMH: denies HTN, DLD, DMTII,  -lipid profile unremarkable except cholesterol is elevated   -started patient on 81 mg ASA, 80 lipitor  -HbA1C- pending  -Hypercoagulable workup  -check MRI brain - if MRI is negative, patient can be discharged  -check Echo  -REEG    #Diet  - no difficulty swallowing  -regular diet    #DVT pp  - enoxaparin 40 mg subQ    #Full code #Transient stuttered speech and right sided numbness: TIA vs seizure vs stroke vs artery dissection  -most common cause of stroke/TIA in young adults is artery dissection, so want to rule out with MRI  -no significant PMH: denies HTN, DLD, DMTII,  -lipid profile unremarkable except cholesterol is elevated   -started patient on 81 mg ASA, 80 lipitor  -HbA1C- pending  -check MRI brain (if MRI is negative, pt can go home; if positive-hypercoag workup)  -check Echo  -check REEG    #Diet  - no difficulty swallowing  -regular diet    #DVT pp  - enoxaparin 40 mg subQ    #Full code

## 2019-08-15 NOTE — H&P ADULT - ASSESSMENT
29 y/m with no PMH presents with chief complain of transient difficulty in speech and right sided numbness:    # Transient dysarthria and Right sided numbness:    Given the age and no significant PMH, less likely to be stroke. Family history unknown as he is adopted  WIll admit to stroke unit. Rule out stroke  CT angio: Negative for any stenosis, CT head: negative  Will get MRI brain, ECHO, Hba1c, lipid profile.  REEG to r/o seizure as cause  s/p asprin 325 and statin 80, c/w aspirin 81 and statin 40 mg    # Diet:  No difficulty swallowing  Regular diet    # Activity:  As tolerated    # DVT ppx:  lovenox    # Full code 29 y/m with no PMH presents with chief complain of transient difficulty in speech and right sided numbness:    # Transient dysarthria and Right sided numbness:    Given the age and no significant PMH, less likely to be stroke. Family history unknown as he is adopted  WIll admit to stroke unit. Rule out stroke/TIA  CT angio: Negative for any stenosis, CT head: negative  Will get MRI brain, ECHO, Hba1c, lipid profile.  REEG to r/o seizure as cause  s/p asprin 325 and statin 80, c/w aspirin 81 and statin 40 mg    # Diet:  No difficulty swallowing  Regular diet    # Activity:  As tolerated    # DVT ppx:  lovenox    # Full code

## 2019-08-16 ENCOUNTER — TRANSCRIPTION ENCOUNTER (OUTPATIENT)
Age: 29
End: 2019-08-16

## 2019-08-16 VITALS — HEIGHT: 71 IN | WEIGHT: 190.7 LBS

## 2019-08-16 LAB — HCYS SERPL-MCNC: 13.1 UMOL/L — SIGNIFICANT CHANGE UP

## 2019-08-16 PROCEDURE — 99232 SBSQ HOSP IP/OBS MODERATE 35: CPT

## 2019-08-16 PROCEDURE — 99239 HOSP IP/OBS DSCHRG MGMT >30: CPT

## 2019-08-16 PROCEDURE — 95819 EEG AWAKE AND ASLEEP: CPT | Mod: 26

## 2019-08-16 RX ORDER — ATORVASTATIN CALCIUM 80 MG/1
1 TABLET, FILM COATED ORAL
Qty: 30 | Refills: 0
Start: 2019-08-16 | End: 2019-09-14

## 2019-08-16 RX ORDER — ATORVASTATIN CALCIUM 80 MG/1
20 TABLET, FILM COATED ORAL AT BEDTIME
Refills: 0 | Status: DISCONTINUED | OUTPATIENT
Start: 2019-08-16 | End: 2019-08-16

## 2019-08-16 RX ADMIN — Medication 81 MILLIGRAM(S): at 11:44

## 2019-08-16 NOTE — PROGRESS NOTE ADULT - SUBJECTIVE AND OBJECTIVE BOX
Stroke Neurology Follow-Up:    Pt seen at bedside with no complaints. Back to baseline. MRI (-) for infarct. Echo (+) for small PFO    Vital Signs Last 24 Hrs  T(C): 36.6 (15 Aug 2019 21:00), Max: 37.1 (15 Aug 2019 13:50)  T(F): 97.8 (15 Aug 2019 21:00), Max: 98.8 (15 Aug 2019 13:50)  HR: 88 (15 Aug 2019 21:00) (72 - 99)  BP: 135/65 (15 Aug 2019 21:00) (116/73 - 141/72)  BP(mean): 89 (15 Aug 2019 02:00) (89 - 92)  RR: 18 (15 Aug 2019 21:00) (18 - 18)  SpO2: 98% (15 Aug 2019 15:49) (98% - 100%)    NIHSS:     LOC 0  Facial 0  Gaze 0  Visual 0  Motor Arm 0  Motor Leg 0  Sensory 0  Ataxia 0  Dysarthria 0  Language 0  Extinction 0  Total: 0      Allergies    No Known Allergies    Intolerances      MEDICATIONS  (STANDING):  aspirin enteric coated 81 milliGRAM(s) Oral daily  atorvastatin 40 milliGRAM(s) Oral at bedtime  chlorhexidine 4% Liquid 1 Application(s) Topical <User Schedule>  enoxaparin Injectable 40 milliGRAM(s) SubCutaneous daily    MEDICATIONS  (PRN):      LABS:                        15.0   10.46 )-----------( 251      ( 14 Aug 2019 23:07 )             45.9     08-14    137  |  100  |  17  ----------------------------<  109<H>  4.1   |  23  |  1.1    Ca    9.8      14 Aug 2019 23:07    TPro  7.6  /  Alb  5.0  /  TBili  1.0  /  DBili  x   /  AST  24  /  ALT  26  /  AlkPhos  81  08-14    PT/INR - ( 14 Aug 2019 23:07 )   PT: 12.30 sec;   INR: 1.07 ratio         PTT - ( 14 Aug 2019 23:07 )  PTT:35.1 sec  Hemoglobin A1C, Whole Blood: 5.3 % (08-15 @ 12:30)      < from: MR Head No Cont (08.15.19 @ 12:20) >  Findings:  The ventricles, basal cisterns and sulcal pattern are within   normal limits for patients stated age.  There are no cerebral, cerebellar   or midbrain parenchymal signal abnormalities.  There is no acute mass   effect, midline shift or hemorrhage.  No extra-axial fluid collections   are identified.      There are no acute infarcts on diffusion weighted images.  Expected   signal flow voids are noted in the major intracranial vessels consistent   with their patency.      Globes and orbits are grossly within normal limits. Signal abnormalities   are noted in the right frontal sinus on axial T2-weighted images   consistent with mucosal thickening and/or inflammatory changes as well as   the bilateral maxillary sinuses on sagittal T1-weighted images consistent   with mucous retention cysts. The remaining paranasal sinuses and   bilateral mastoid complexes are within normal limits.      There is no bone marrow signal abnormality.  The sella is unremarkable.        IMPRESSION:    1.  No acute infarcts, intracranial hemorrhage or brain parenchymal   signal abnormalities.    2.  Bilateral maxillary and right frontal sinus disease otherwise   unremarkable MRI of the brain without contrast.    < end of copied text >    < from: CT Angio Head w/ IV Cont (08.14.19 @ 23:52) >    Essentially unremarkable CTA of the head and neck-no focal stenosis or   aneurysm identified at this time    < end of copied text >    < from: Transthoracic Echocardiogram (08.15.19 @ 14:45) >  Summary:   1. LV Ejection Fraction by Murray's Method with a biplane EF of 60 %.   2. Normal left ventricular size and wall thicknesses, with normal   systolic and diastolic function.   3. Estimated pulmonary artery systolic pressure is 37.0 mmHg assuming a   right atrial pressure of 3 mmHg, which is consistent with borderline   pulmonary hypertension.   4. Small patent foramen ovale, with bidirectional shunting across atrial   septum.    < end of copied text >
Pt seen and examined with neurologist. No new complaints. At baseline. LE doppler negative. Hypercoag workup collected. Outpt f/u with neuro and cardio for possible RON.    Gen: NAD, AAOx3  CV: S1 S2  Resp: Decreased BS b/l  GI: NT/ND/S +BS  MS: neg c/c/e  Neuro: nonfocal    Discharge instructions discussed and patient/spouse know when to seek immediate medical attention.  Patient has proper follow up.  All results discussed and patient aware they require further work up.  Stressed importance of proper follow up.  Medications prescribed and changes discussed.  All questions and concerns from patient and family addressed. Understanding of instructions verbalized.    Time spent in completing discharge process and coordinating care 45 minutes.    Discussed with housestaff, nursing, social work, neuro

## 2019-08-16 NOTE — DISCHARGE NOTE NURSING/CASE MANAGEMENT/SOCIAL WORK - NSDCDPATPORTLINK_GEN_ALL_CORE
You can access the [employer] Patient Portal, offered by [facility], by registering with the following website: [email or web address]

## 2019-08-16 NOTE — PROGRESS NOTE ADULT - ASSESSMENT
Impression:  30 y/o man with no PMH presenting with episode of right sided numbness and expressive aphasia. Back to baseline. MRI (-). Small PFO on Echo.    Plan:  f/u on EEG report  Hypercoag labs were drawn, f/u results outpatient  discharge plan for today if NIHSS remains zero  outpatient neurology follow-up Impression:  30 y/o man with no PMH presenting with episode of right sided numbness and expressive aphasia. Back to baseline. MRI (-). Small PFO on Echo.    Plan:  f/u on EEG report  Hypercoag labs were drawn, f/u results outpatient  discharge plan for today if NIHSS remains zero  outpatient Stroke clinic follow-up  ASA 81mg QD and atorvastatin 20mg QD

## 2019-08-16 NOTE — CHART NOTE - NSCHARTNOTEFT_GEN_A_CORE
<<<RESIDENT DISCHARGE NOTE>>>     MEG ZULETA  MRN-[id number]    VITAL SIGNS:  T(F): 96.7 (08-16-19 @ 04:53), Max: 98.8 (08-15-19 @ 13:50)  HR: 73 (08-16-19 @ 04:53)  BP: 120/72 (08-16-19 @ 04:53)  SpO2: 98% (08-15-19 @ 15:49)      PHYSICAL EXAMINATION:  General: NAD  Head & Neck: NCAT  Pulmonary: CTA b/l  Cardiovascular: +s1s2 RRR  Gastrointestinal/Abdomen & Pelvis: soft, NT/ND (+) bs  Neurologic/Motor: FROM x 4 5/5    TEST RESULTS:                        15.0   10.46 )-----------( 251      ( 14 Aug 2019 23:07 )             45.9       08-14    137  |  100  |  17  ----------------------------<  109<H>  4.1   |  23  |  1.1    Ca    9.8      14 Aug 2019 23:07    TPro  7.6  /  Alb  5.0  /  TBili  1.0  /  DBili  x   /  AST  24  /  ALT  26  /  AlkPhos  81  08-14      FINAL DISCHARGE INTERVIEW:  Resident(s) Present: (Name: ANNY Hill Present: (Name: Nina)    DISCHARGE MEDICATION RECONCILIATION  reviewed with Attending (Name: Dr. Mayo)    DISPOSITION:   [ x ] Home,    [  ] Home with Visiting Nursing Services,   [    ]  SNF/ NH,    [   ] Acute Rehab (4A),   [   ] Other (Specify:)

## 2019-08-16 NOTE — PROGRESS NOTE ADULT - ATTENDING COMMENTS
Patient seen and examined and agree with above except as noted.  Discussed all results with patient and discussed pending testing.  Event highly suspicious for TIA given PFO will await pending hypercoagulable labs and get RON as out patient    Plan as above (needs cardio f/u for RON as out patient as well)

## 2019-08-18 LAB — B2 GLYCOPROT1 AB SER QL: NEGATIVE — SIGNIFICANT CHANGE UP

## 2019-08-19 LAB
AT III ACT/NOR PPP CHRO: 112 % — SIGNIFICANT CHANGE UP (ref 85–135)
CARDIOLIPIN AB SER-ACNC: NEGATIVE — SIGNIFICANT CHANGE UP

## 2019-08-20 DIAGNOSIS — R20.0 ANESTHESIA OF SKIN: ICD-10-CM

## 2019-08-20 DIAGNOSIS — R29.700 NIHSS SCORE 0: ICD-10-CM

## 2019-08-20 DIAGNOSIS — M54.2 CERVICALGIA: ICD-10-CM

## 2019-08-20 DIAGNOSIS — R47.1 DYSARTHRIA AND ANARTHRIA: ICD-10-CM

## 2019-08-20 DIAGNOSIS — G43.109 MIGRAINE WITH AURA, NOT INTRACTABLE, WITHOUT STATUS MIGRAINOSUS: ICD-10-CM

## 2019-08-20 DIAGNOSIS — R56.9 UNSPECIFIED CONVULSIONS: ICD-10-CM

## 2019-08-20 DIAGNOSIS — G45.9 TRANSIENT CEREBRAL ISCHEMIC ATTACK, UNSPECIFIED: ICD-10-CM

## 2019-08-20 DIAGNOSIS — R47.01 APHASIA: ICD-10-CM

## 2019-08-20 DIAGNOSIS — Q21.1 ATRIAL SEPTAL DEFECT: ICD-10-CM

## 2019-08-20 LAB — APCR PPP: 3.03 RATIO — SIGNIFICANT CHANGE UP

## 2019-08-21 LAB
DNA PLOIDY SPEC FC-IMP: SIGNIFICANT CHANGE UP
DRVVT SCREEN TO CONFIRM RATIO: SIGNIFICANT CHANGE UP
LA NT DPL PPP QL: SIGNIFICANT CHANGE UP
PROT C ACT/NOR PPP: 120 % — SIGNIFICANT CHANGE UP (ref 65–129)
PROT S FREE PPP-ACNC: 90 % NORMAL — SIGNIFICANT CHANGE UP (ref 70–150)
PTR INTERPRETATION: SIGNIFICANT CHANGE UP

## 2019-08-30 ENCOUNTER — APPOINTMENT (OUTPATIENT)
Dept: CARDIOLOGY | Facility: CLINIC | Age: 29
End: 2019-08-30
Payer: OTHER GOVERNMENT

## 2019-08-30 VITALS
DIASTOLIC BLOOD PRESSURE: 70 MMHG | HEART RATE: 91 BPM | SYSTOLIC BLOOD PRESSURE: 108 MMHG | BODY MASS INDEX: 27.3 KG/M2 | HEIGHT: 71 IN | WEIGHT: 195 LBS

## 2019-08-30 DIAGNOSIS — Z87.898 PERSONAL HISTORY OF OTHER SPECIFIED CONDITIONS: ICD-10-CM

## 2019-08-30 PROCEDURE — 93000 ELECTROCARDIOGRAM COMPLETE: CPT

## 2019-08-30 PROCEDURE — 99204 OFFICE O/P NEW MOD 45 MIN: CPT

## 2019-08-30 RX ORDER — ASPIRIN 81 MG
81 TABLET, DELAYED RELEASE (ENTERIC COATED) ORAL DAILY
Refills: 0 | Status: ACTIVE | COMMUNITY

## 2019-08-30 NOTE — PHYSICAL EXAM
[General Appearance - Well Developed] : well developed [Normal Appearance] : normal appearance [Well Groomed] : well groomed [General Appearance - Well Nourished] : well nourished [No Deformities] : no deformities [General Appearance - In No Acute Distress] : no acute distress [Normal Oral Mucosa] : normal oral mucosa [No Oral Pallor] : no oral pallor [No Oral Cyanosis] : no oral cyanosis [Normal Jugular Venous A Waves Present] : normal jugular venous A waves present [Normal Jugular Venous V Waves Present] : normal jugular venous V waves present [No Jugular Venous Hines A Waves] : no jugular venous hines A waves [Respiration, Rhythm And Depth] : normal respiratory rhythm and effort [Exaggerated Use Of Accessory Muscles For Inspiration] : no accessory muscle use [Auscultation Breath Sounds / Voice Sounds] : lungs were clear to auscultation bilaterally [Heart Rate And Rhythm] : heart rate and rhythm were normal [Heart Sounds] : normal S1 and S2 [Murmurs] : no murmurs present [Abdomen Soft] : soft [Abdomen Tenderness] : non-tender [Abdomen Mass (___ Cm)] : no abdominal mass palpated [Nail Clubbing] : no clubbing of the fingernails [Cyanosis, Localized] : no localized cyanosis [Petechial Hemorrhages (___cm)] : no petechial hemorrhages [] : no ischemic changes [Skin Color & Pigmentation] : normal skin color and pigmentation [Oriented To Time, Place, And Person] : oriented to person, place, and time

## 2019-08-30 NOTE — HISTORY OF PRESENT ILLNESS
[FreeTextEntry1] :  30 yo male with pmhx as below is here for a post hx f/up\par recent admission to [facility] with TIA, w/up revealed PFO\par MRI/CT head/hypercoag w/up unrem\par no active cvs complains\par et is excellent\par ros is otherwise as below

## 2019-08-30 NOTE — ASSESSMENT
[FreeTextEntry1] : 28 yo male with pmhx and presentation as above\par TIA\par dyslipidemia\par cont meds\par set up for RON\par f/up with neuro\par aggressive risk modif\par diet and act as tolerated\par rtc after RON

## 2019-09-03 ENCOUNTER — APPOINTMENT (OUTPATIENT)
Dept: NEUROLOGY | Facility: CLINIC | Age: 29
End: 2019-09-03
Payer: OTHER GOVERNMENT

## 2019-09-03 VITALS
WEIGHT: 194 LBS | BODY MASS INDEX: 27.16 KG/M2 | DIASTOLIC BLOOD PRESSURE: 80 MMHG | HEART RATE: 80 BPM | HEIGHT: 71 IN | SYSTOLIC BLOOD PRESSURE: 121 MMHG

## 2019-09-03 PROCEDURE — 99214 OFFICE O/P EST MOD 30 MIN: CPT

## 2019-09-05 ENCOUNTER — OTHER (OUTPATIENT)
Age: 29
End: 2019-09-05

## 2019-09-06 NOTE — PHYSICAL EXAM
[FreeTextEntry1] : Orientation: oriented to person, oriented to place and oriented to time. \par Attention: normal concentrating ability and visual attention was not decreased. \par Language: no difficulty naming common objects, no difficulty repeating a phrase, no difficulty writing a sentence, fluency intact, comprehension intact and reading intact. \par Fund of knowledge: displays adequate knowledge of personal past history. \par Cranial Nerves:  visual fields full to confrontation, pupils equal round and reactive to light, extraocular motion intact, facial sensation intact symmetrically, face symmetrical, hearing was intact bilaterally, tongue and palate midline, head turning and shoulder shrug symmetric and there was no tongue deviation with protrusion. \par Motor: muscle tone was normal in all four extremities, muscle strength was normal in all four extremities and normal bulk in all four extremities. \par Sensory exam: light touch, PP, Vibration was intact. \par Coordination:. normal gait. balance was intact. there was no past-pointing. no tremor present. \par Deep tendon reflexes: \par Biceps right 2+. Biceps left 2+.  \par Triceps right 2+. Triceps left 2+.  LOC\par Brachioradialis right 2+. Brachioradialis left 2+.  \par Patella right 2+. Patella left 2+.  \par Ankle jerk right 2+. Ankle jerk left 2+. \par Plantar responses normal on the right, normal on the left.  \par \par NIHSS 0\par mrankin 0

## 2019-09-06 NOTE — DISCUSSION/SUMMARY
[FreeTextEntry1] : Mr. Kendrick is a 30yo man with episode suspicious for TIA with risk factor of elevated LDL and PFO.\par 1. Referred to Hemetology for repeat hypercoagulable labs\par 2. f/u with cardiology for RON next week.\par 3. If RON fails to show additional findings other than the PFO then can return to work full duty no restrictions\par 4. Consider a 30 day event monitor\par 5. Continue asa 81mg QD\par 6. Repeat Lipid profile in 1-2 months and adjust Lipitor as appropriate.\par \par Addendum 9/6/2019 - Discussed with patient and his family that the diagnosis was suspicious for TIA but no clear diagnosis was made.  The further tests were recommended to further elucidate whether that possibility was more or less likely.\par

## 2019-09-06 NOTE — HISTORY OF PRESENT ILLNESS
[FreeTextEntry1] : 28 y/o left handed man with no PMH presents c/o expressive aphasia approx 4 hours prior to presentation accompanied by right face, arm and leg numbness, all of which resolved prior to arrival on 8/14/2019.  He was kept for workup of TIA and had an MRI brain which was negative and CTA of Head and neck which was unremarkable.  LDL was 127 and hypercoagulable labs were negative with only the DRVVT S/C ratio being positive.  Proothrombin and factor V genetic were normal.  TTE with bubble showed a pfo.  EEG was normal.  He has had no episodes since this episode.  He feels completely normal.

## 2019-09-08 ENCOUNTER — FORM ENCOUNTER (OUTPATIENT)
Age: 29
End: 2019-09-08

## 2019-09-09 ENCOUNTER — OUTPATIENT (OUTPATIENT)
Dept: OUTPATIENT SERVICES | Facility: HOSPITAL | Age: 29
LOS: 1 days | Discharge: HOME | End: 2019-09-09
Payer: OTHER GOVERNMENT

## 2019-09-09 VITALS
DIASTOLIC BLOOD PRESSURE: 85 MMHG | WEIGHT: 194.01 LBS | HEIGHT: 71 IN | TEMPERATURE: 98 F | HEART RATE: 90 BPM | OXYGEN SATURATION: 100 % | RESPIRATION RATE: 15 BRPM | SYSTOLIC BLOOD PRESSURE: 134 MMHG

## 2019-09-09 VITALS — WEIGHT: 193.79 LBS | HEIGHT: 71 IN

## 2019-09-09 DIAGNOSIS — Q21.1 ATRIAL SEPTAL DEFECT: ICD-10-CM

## 2019-09-09 PROCEDURE — 93312 ECHO TRANSESOPHAGEAL: CPT | Mod: 26,59

## 2019-09-09 NOTE — ASU PATIENT PROFILE, ADULT - PMH
Atherogenic dyslipidemia    History of TIA (transient ischemic attack)    PFO (patent foramen ovale)

## 2019-09-09 NOTE — CHART NOTE - NSCHARTNOTEFT_GEN_A_CORE
POST OPERATIVE PROCEDURAL DOCUMENTATION  PRE-OP DIAGNOSIS: PFO    POST-OP DIAGNOSIS: secundum ASD with bidirectional shunt    PROCEDURE: Transesophageal echocardiogram    Primary Physician: surjit  Assistant: Paul    ANESTHESIA TYPE  [  ] General Anesthesia  [ x ] Conscious Sedation  [  ] Local/Regional    CONDITION  [  ] Critical  [  ] Serious  [  ] Fair  [ x] Good    SPECIMENS REMOVED (IF APPLICABLE): N/A    IMPLANTS (IF APPLICABLE): None    ESTIMATED BLOOD LOSS: None    COMPLICATIONS: None      FINDINGS:    After risks and benefits of procedures were explained, informed consent was obtained and placed in chart.   The patient received topical anesthestic to the oropharynx with viscous lidocaine and benzocaine spray.  Refer to Anesthesia note for sedation details.  The RON probe was passed into the esophagus without difficulty.  Transesophageal and transgastric images were obtained.  The RON probe was removed without difficulty and examined.  There was no evidence for bleeding.  The patient tolerated the procedure well without any immediate RON-related complications.      Preliminary Findings:  SAMUEL: Left atrial appendage was clear of clot and smoke.  LV: LVEF was estimated at 55-65%  MV: No evidence for MR, No evidence for MS.   AV: No evidence for AI, no evidence for AS.   TV: trace TR.   IAS: -0.6 cm secundum ASD with bidirectional shunting by color doppler, CW doppler, and Agitated saline.   There was no atheroma seen in the thoracic aorta.         DIAGNOSIS/IMPRESSION: secundum ASD 0.6 cm wide with bidirectional shunting    PLAN OF CARE: D/C home today, follow up with neurology as outpatient. Possible evaluation for closure in future.

## 2019-09-09 NOTE — H&P CARDIOLOGY - HISTORY OF PRESENT ILLNESS
29 yerar old male PMHx of TIa within the last month with symptoms that have resolved and no subsequent symptoms. Workup revealed a PFO and there was nothing found on bloodwork. He does not know his family history due to being adopted.

## 2019-09-13 ENCOUNTER — EMERGENCY (EMERGENCY)
Facility: HOSPITAL | Age: 29
LOS: 0 days | Discharge: HOME | End: 2019-09-14
Admitting: EMERGENCY MEDICINE
Payer: OTHER GOVERNMENT

## 2019-09-13 VITALS
HEIGHT: 71 IN | HEART RATE: 100 BPM | SYSTOLIC BLOOD PRESSURE: 135 MMHG | TEMPERATURE: 97 F | RESPIRATION RATE: 18 BRPM | OXYGEN SATURATION: 99 % | WEIGHT: 194.01 LBS | DIASTOLIC BLOOD PRESSURE: 95 MMHG

## 2019-09-13 DIAGNOSIS — S92.501A DISPLACED UNSPECIFIED FRACTURE OF RIGHT LESSER TOE(S), INITIAL ENCOUNTER FOR CLOSED FRACTURE: ICD-10-CM
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
## 2019-09-13 DIAGNOSIS — M79.676 PAIN IN UNSPECIFIED TOE(S): ICD-10-CM
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
## 2019-09-13 PROCEDURE — 99284 EMERGENCY DEPT VISIT MOD MDM: CPT | Mod: 25

## 2019-09-13 PROCEDURE — 28510 TREATMENT OF TOE FRACTURE: CPT | Mod: 54

## 2019-09-13 PROCEDURE — 99053 MED SERV 10PM-8AM 24 HR FAC: CPT

## 2019-09-14 PROBLEM — E78.5 HYPERLIPIDEMIA, UNSPECIFIED: Chronic | Status: ACTIVE | Noted: 2019-09-09

## 2019-09-14 PROBLEM — Q21.1 ATRIAL SEPTAL DEFECT: Chronic | Status: ACTIVE | Noted: 2019-09-09

## 2019-09-14 PROBLEM — Z86.73 PERSONAL HISTORY OF TRANSIENT ISCHEMIC ATTACK (TIA), AND CEREBRAL INFARCTION WITHOUT RESIDUAL DEFICITS: Chronic | Status: ACTIVE | Noted: 2019-09-09

## 2019-09-14 PROCEDURE — 73660 X-RAY EXAM OF TOE(S): CPT | Mod: 26,RT

## 2019-09-14 NOTE — ED PROVIDER NOTE - PHYSICAL EXAMINATION
CONST: Well appearing in NAD  MS: + ecchymosis and swelling of right 5th toe, FROM of toe without difficulty, diffuse TTP, Normal ROM in all extremities. No edema of lower extremities, no calf pain, pedal pulses 2+ bilaterally, capillary refill < 2 seconds  SKIN: Warm, dry, no acute rashes. Good turgor  NEURO: right foot nvi

## 2019-09-14 NOTE — ED PROVIDER NOTE - OBJECTIVE STATEMENT
29 y.o m presents to the ED for evaluation of R 5th toe pain.  Today was walking and hit toe on something hard.  Since then intermittent throbbing toe pain, mild severity, worse w/ ambulation, alleviated with rest, no radiation of the pain. Denies paresthesias, ankle/knee/hip pain.

## 2019-09-14 NOTE — ED PROVIDER NOTE - PATIENT PORTAL LINK FT
You can access the FollowMyHealth Patient Portal offered by [facility] by registering at the following website: [email or web address]. By joining [employer]’s FollowMyHealth portal, you will also be able to view your health information using other applications (apps) compatible with our system.

## 2019-09-14 NOTE — ED PROVIDER NOTE - NS ED ROS FT
CONST: No fever, chills or bodyaches  MS: + toe pain. no ankle, knee or hip pain.  SKIN: No rashes  NEURO: No headache, dizziness, paresthesias

## 2019-09-14 NOTE — ED PROVIDER NOTE - CARE PROVIDER_API CALL
Harvey Lara (DPM)  [facility]  [address], [address]  [place] [address]  Phone: [phone]  Fax: [phone]  Follow Up Time: 1-3 Days

## 2019-09-14 NOTE — ED PROVIDER NOTE - CLINICAL SUMMARY MEDICAL DECISION MAKING FREE TEXT BOX
toe fx, juan ramon splint and hard sole shoe.  f/u with pod. I have discussed the discharge plan with the patient. The patient agrees with the plan, as discussed.  The patient understands Emergency Department diagnosis is a preliminary diagnosis often based on limited information and that the patient must adhere to the follow-up plan as discussed.  The patient understands that if the symptoms worsen or if prescribed medications do not have the desired/planned effect that the patient may return to the Emergency Department at any time for further evaluation and treatment.

## 2019-09-14 NOTE — ED PROVIDER NOTE - PROGRESS NOTE DETAILS
Discussed results with pt.  All questions were answered and return precautions discussed.  Pt is asx and comfortable at this time.  Unremarkable re-exam.  No further concerns at this time from pt.  Will follow up with PMD and podiatry.  Pt understands and agrees with tx plan.

## 2019-09-16 ENCOUNTER — LABORATORY RESULT (OUTPATIENT)
Age: 29
End: 2019-09-16

## 2019-09-16 ENCOUNTER — OUTPATIENT (OUTPATIENT)
Dept: OUTPATIENT SERVICES | Facility: HOSPITAL | Age: 29
LOS: 1 days | Discharge: HOME | End: 2019-09-16

## 2019-09-16 ENCOUNTER — APPOINTMENT (OUTPATIENT)
Dept: HEMATOLOGY ONCOLOGY | Facility: CLINIC | Age: 29
End: 2019-09-16
Payer: OTHER GOVERNMENT

## 2019-09-16 VITALS
BODY MASS INDEX: 28.14 KG/M2 | HEART RATE: 77 BPM | TEMPERATURE: 97 F | SYSTOLIC BLOOD PRESSURE: 117 MMHG | DIASTOLIC BLOOD PRESSURE: 70 MMHG | WEIGHT: 201 LBS | HEIGHT: 71 IN

## 2019-09-16 PROCEDURE — 99244 OFF/OP CNSLTJ NEW/EST MOD 40: CPT

## 2019-09-16 NOTE — ED PROCEDURE NOTE - CPROC ED COMPLICATIONS1
no Quality 110: Preventive Care And Screening: Influenza Immunization: Influenza Immunization previously received during influenza season Detail Level: Detailed

## 2019-09-17 LAB
HCT VFR BLD CALC: 45 %
HCYS SERPL-MCNC: 10.3 UMOL/L
HGB BLD-MCNC: 14.9 G/DL
MCHC RBC-ENTMCNC: 28.5 PG
MCHC RBC-ENTMCNC: 33.1 G/DL
MCV RBC AUTO: 86.2 FL
PLATELET # BLD AUTO: 214 K/UL
PMV BLD: 8.6 FL
RBC # BLD: 5.22 M/UL
RBC # FLD: 12.8 %
WBC # FLD AUTO: 7.06 K/UL

## 2019-09-17 NOTE — HISTORY OF PRESENT ILLNESS
[de-identified] : 30 y/o left handed man was referred by Dr Puente for further evaluation and management of positive lupus anticoagulant in the setting of a TIA. \par \par Pt had presented to ER in 8/14/2019 with expressive aphasia approx 4 hours prior to presentation accompanied by right face, arm and leg numbness, all of which resolved prior to arrival..  He was kept for workup of TIA and had an MRI brain which was negative and CTA of Head and neck which was unremarkable.  LDL was 127 and hypercoagulable labs were negative with only the DRVVT S/C ratio being positive.  Proothrombin mutation and factor V leydein were normal.  TTE with bubble showed a pfo.  RON showed small septum secondum defect. Venous duplex negative. EEG was normal.  He has had no episodes since this episode.  He feels completely normal.\par \par Pt denies having previous episode of stroke. No h/o DVT or heart disease. Family history unknown as the pt was adopted. No previous h/o cancer or recent weight loss. No joint disease or rash.

## 2019-09-17 NOTE — CONSULT LETTER
[Consult Letter:] : I had the pleasure of evaluating your patient, [unfilled]. [Dear  ___] : Dear  [unfilled], [Consult Closing:] : Thank you very much for allowing me to participate in the care of this patient.  If you have any questions, please do not hesitate to contact me. [Please see my note below.] : Please see my note below. [Sincerely,] : Sincerely, [DrOmega  ___] : Dr. ALDRIDGE [FreeTextEntry3] : Dr. Sandoval

## 2019-09-17 NOTE — REASON FOR VISIT
[Initial Consultation] : an initial consultation for [FreeTextEntry2] : R/O hypercoagulable disorder

## 2019-09-17 NOTE — ASSESSMENT
[FreeTextEntry1] : 28 y/o M was referred for further evaluation and management of positive LA in the setting of TIA , small pfo . \par \par #Positive LA:, negative cardiolipin and beta 2 glycoprotein abs Will repeat LA now along with CBC and homocysteine\par --On ASA and lipitor\par --Consider holter monitor, rule out occult atrial fibrillation .\par --F/U neurology and cardiology.

## 2019-09-23 LAB
CONFIRM: NORMAL
DRVVT IMM 1:2 NP PPP: NORMAL
DRVVT SCREEN TO CONFIRM RATIO: 1.14 RATIO
SCREEN DRVVT: NORMAL
SILICA CLOTTING TIME INTERPRETATION: NORMAL
SILICA CLOTTING TIME S/C: 0.91 RATIO

## 2019-09-25 ENCOUNTER — APPOINTMENT (OUTPATIENT)
Dept: CARDIOLOGY | Facility: CLINIC | Age: 29
End: 2019-09-25
Payer: OTHER GOVERNMENT

## 2019-09-25 VITALS
HEART RATE: 85 BPM | BODY MASS INDEX: 28.7 KG/M2 | SYSTOLIC BLOOD PRESSURE: 124 MMHG | DIASTOLIC BLOOD PRESSURE: 78 MMHG | WEIGHT: 205 LBS | HEIGHT: 71 IN

## 2019-09-25 PROCEDURE — 93000 ELECTROCARDIOGRAM COMPLETE: CPT

## 2019-09-25 PROCEDURE — 99214 OFFICE O/P EST MOD 30 MIN: CPT

## 2019-09-25 NOTE — HISTORY OF PRESENT ILLNESS
[FreeTextEntry1] :  30 yo male with pmhx as below is here for a f/up visit\par recent admission to [facility] with TIA, RON revealed small asd with bidirectional flow\par MRI/CT head/hypercoag w/up unrem\par no active cvs complains\par et is excellent\par ros is otherwise as below

## 2019-09-25 NOTE — ASSESSMENT
[FreeTextEntry1] : 28 yo male with pmhx and presentation as above\par TIA\par dyslipidemia\par cont meds\par RON findings d/w pt and neuro - would likely benefit form ASD closure\par will refer to Dr. Griffith\par f/up with neuro/hem\par aggressive risk modif\par diet and act as tolerated\par rtc 2 moths

## 2019-09-30 DIAGNOSIS — G45.9 TRANSIENT CEREBRAL ISCHEMIC ATTACK, UNSPECIFIED: ICD-10-CM

## 2019-09-30 DIAGNOSIS — R76.0 RAISED ANTIBODY TITER: ICD-10-CM

## 2019-10-16 ENCOUNTER — APPOINTMENT (OUTPATIENT)
Dept: CARDIOTHORACIC SURGERY | Facility: CLINIC | Age: 29
End: 2019-10-16
Payer: OTHER GOVERNMENT

## 2019-10-16 VITALS
WEIGHT: 194 LBS | BODY MASS INDEX: 27.16 KG/M2 | DIASTOLIC BLOOD PRESSURE: 78 MMHG | HEIGHT: 71 IN | OXYGEN SATURATION: 97 % | TEMPERATURE: 98.7 F | HEART RATE: 77 BPM | SYSTOLIC BLOOD PRESSURE: 109 MMHG | RESPIRATION RATE: 12 BRPM

## 2019-10-16 DIAGNOSIS — Z78.9 OTHER SPECIFIED HEALTH STATUS: ICD-10-CM

## 2019-10-16 DIAGNOSIS — Z87.81 PERSONAL HISTORY OF (HEALED) TRAUMATIC FRACTURE: ICD-10-CM

## 2019-10-16 DIAGNOSIS — Z02.82 ENCOUNTER FOR ADOPTION SERVICES: ICD-10-CM

## 2019-10-16 PROCEDURE — 99204 OFFICE O/P NEW MOD 45 MIN: CPT

## 2019-10-16 NOTE — PHYSICAL EXAM
[General Appearance - Well Nourished] : well nourished [General Appearance - In No Acute Distress] : in no acute distress [General Appearance - Alert] : alert [General Appearance - Well-Appearing] : healthy appearing [General Appearance - Well Developed] : well developed [Sclera] : the sclera and conjunctiva were normal [Outer Ear] : the ears and nose were normal in appearance [Strabismus] : no strabismus was seen [Hearing Threshold Finger Rub Not [place]] : hearing was normal [Examination Of The Oral Cavity] : the lips and gums were normal [Neck Appearance] : the appearance of the neck was normal [Neck Cervical Mass (___cm)] : no neck mass was observed [Jugular Venous Distention Increased] : there was no jugular-venous distention [] : no respiratory distress [Respiration, Rhythm And Depth] : normal respiratory rhythm and effort [Exaggerated Use Of Accessory Muscles For Inspiration] : no accessory muscle use [Auscultation Breath Sounds / Voice Sounds] : lungs were clear to auscultation bilaterally [Heart Rate And Rhythm] : heart rate was normal and rhythm regular [Heart Sounds] : normal S1 and S2 [Heart Sounds Gallop] : no gallops [Murmurs] : no murmurs [Heart Sounds Pericardial Friction Rub] : no pericardial rub [Abdomen Soft] : soft [Examination Of The Chest] : the chest was normal in appearance [Abnormal Walk] : normal gait [Nail Clubbing] : no clubbing  or cyanosis of the fingernails [Involuntary Movements] : no involuntary movements were seen [Skin Color & Pigmentation] : normal skin color and pigmentation [Oriented To Time, Place, And Person] : oriented to person, place, and time [Impaired Insight] : insight and judgment were intact [Affect] : the affect was normal [Mood] : the mood was normal

## 2019-10-18 NOTE — DATA REVIEWED
[FreeTextEntry1] :  EXAM:  RON:W PROBE PLACMT-GLOBAL  \par \par \par PROCEDURE DATE:  2019  \par \par INTERPRETATION:  REPORT:  \par TRANSESOPHAGEAL ECHOCARDIOGRAM REPORT\par \par  \par \par Patient Name:   MEG ZULETA Accession #: [id number]\par Medical Rec #:  [id number]     Height:      71.0 in 180.3 cm\par YOB: 1990     Weight:      194.0 lb 88.00 kg\par Patient Age:    29 years      BSA:         2.08 m²\par Patient Gender: M             BP:          134/81 mmHg\par  \par \par Date of Exam:        2019 8:01:01 AM\par Referring Physician: [id number] PORFIRIO QUINTANA\par Sonographer:         Manpreet Shepherd\par Fellow:              Jarad Miller\par  \par Reading Physician:   Porfirio Quintana M.D.\par \par Procedure: Transesophageal Echocardiogram.\par Diagnosis: G45.9 Transient cerebral ischemic attack, unspecified\par \par  \par \par Summary:\par  1. Left ventricular ejection fraction, by visual estimation, is 55 to \par 60%.\par  2. Normal global left ventricular systolic function.\par  3. Small secundum atrial septal defect with bidirectional shunting \par across atrial septum.\par \par \par PROCEDURE: After discussion of the risks and benefits of the RON, an \par informed consent was obtained by the cardiologist. Intravenous sedation \par was performed by anesthesia. Local oropharyngeal anesthetic was provided \par with Benzocaine spray. The RON probe was passed by the fellow without \par difficulty. Images were obtained with the patient in a supine position. \par Image quality was excellent. The patient tolerated the procedure well and \par without complications.\par  \par PHYSICIAN INTERPRETATION:\par Left Ventricle: The left ventricular internal cavity size is normal. \par Global LV systolic function was normal. Left ventricular ejection \par fraction, by visual estimation, is 55 to 60%. Normal segmental left \par ventricular systolic function.\par Right Ventricle: Normal right ventricular size and function. The right \par ventricular size is normal. RV wall thickness is normal. RV systolic \par function is normal.\par Left Atrium: Normal left atrial size. No left atrial appendage thrombus \par is seen and normal left atrial appendage velocities.\par Right Atrium: Normal right atrial size.\par Pericardium: There is no evidence of pericardial effusion.\par Mitral Valve: The mitral valve is normal in structure. No evidence of \par mitral stenosis. No evidence of mitral valve stenosis. Trace mitral valve \par regurgitation is seen.\par Tricuspid Valve: The tricuspid valve is normal in structure. Trivial \par tricuspid regurgitation is visualized.\par Aortic Valve: The aortic valve is trileaflet. No evidence of aortic \par stenosis. No aortic stenosis. No evidence of aortic valve regurgitation \par is seen.\par Pulmonic Valve: The pulmonic valve is normal. Trace pulmonic valve \par regurgitation.\par Aorta: The aortic root, ascending aorta and aortic arch are all \par structurally normal, with no evidence of dilitation or obstruction.\par Shunts: Agitated saline contrast was given intravenously to evaluate for \par intracardiac shunting. There is a small secundum atrial septal defect \par with bidirectional shunting across atrial septum.\par SPECTRAL DOPPLER ANALYSIS:\par  \par [id number] Porfirio Quintana M.D., Electronically signed on 2019 at 3:15:18 PM\par  \par \par *** Final ***\par \par  PORFIRIO QUINTANA MD\par This document has been electronically signed. Sep  9 2019  8:01AM\par \par \par EXAM: 2-D ECHO (TTE) COMPLETE \par \par \par PROCEDURE DATE: 08/15/2019 \par \par INTERPRETATION: REPORT: \par TRANSTHORACIC ECHOCARDIOGRAM REPORT \par \par \par \par Patient Name: MEG ZULTEA Accession #: [id number] \par Medical Rec #: [id number] Height: 61.0 in 154.9 cm \par YOB: 1990 Weight: 189.0 lb 85.73 kg \par Patient Age: 29 years BSA: 1.84 mï¿½ \par Patient Gender: M BP: 120/72 mmHg \par \par \par Date of Exam: 8/15/2019 2:45:09 PM \par Referring Physician: [id number] MILADYS JARVIS \par Sonographer: [facility] \par Reading Physician: Porfirio Quintana M.D. \par \par Procedure: 2D Echo/Doppler/Color Doppler Complete. \par Indications: I63.50 - Occlusion of Cerebral Artery, with Cerebral Infarction \par Diagnosis: Cerebral infarction due to unspecified occlusion or stenosis of \par  unspecified cerebral artery - I63.50 \par \par \par \par Summary: \par  1. LV Ejection Fraction by Murray's Method with a biplane EF of 60 %. \par  2. Normal left ventricular size and wall thicknesses, with normal systolic \par and diastolic function. \par  3. Estimated pulmonary artery systolic pressure is 37.0 mmHg assuming a \par right atrial pressure of 3 mmHg, which is consistent with borderline \par pulmonary hypertension. \par  4. Small patent foramen ovale, with bidirectional shunting across atrial \par septum. \par \par PHYSICIAN INTERPRETATION: \par Left Ventricle: Normal left ventricular size and wall thicknesses, with \par normal systolic and diastolic function. \par Right Ventricle: Normal right ventricular size and function. \par Left Atrium: Normal left atrial size. \par Right Atrium: Normal right atrial size. \par Pericardium: There is no evidence of pericardial effusion. \par Mitral Valve: The mitral valve is normal in structure. No evidence of mitral \par stenosis. Trace mitral valve regurgitation is seen. \par Tricuspid Valve: The tricuspid valve is normal in structure. Mild tricuspid \par regurgitation is visualized. Estimated pulmonary artery systolic pressure is \par 37.0 mmHg assuming a right atrial pressure of 3 mmHg, which is consistent \par with borderline pulmonary hypertension. \par Aortic Valve: The aortic valve is normal. No evidence of aortic stenosis. No \par evidence of aortic valve regurgitation is seen. \par Pulmonic Valve: Structurally normal pulmonic valve, with normal leaflet \par excursion. The pulmonic valve is normal. Trace pulmonic valve regurgitation. \par Aorta: The aortic root and ascending aorta are structurally normal, with no \par evidence of dilitation. \par Pulmonary Artery: The main pulmonary artery is normal in size. \par Shunts: A small patent foramen ovale is detected, with bidirectional \par shunting across atrial septum. \par \par \par 2D AND M-MODE MEASUREMENTS (normal ranges within parentheses): \par Left Normal Aorta/Left Normal \par Ventricle: Atrium: \par IVSd (2D): 0.72 cm (0.7-1.1) AoV Cusp 2.26 (1.5-2.6) \par LVPWd 0.87 cm (0.7-1.1) Separation: cm \par (2D): Left Atrium 2.62 (1.9-4.0) \par LVIDd 4.92 cm (3.4-5.7) (Mmode): cm \par (2D): LA Volume 25.2 \par LVIDs 3.17 cm Index ml/mï¿½ \par (2D): Right \par LV FS 35.6 % (>25%) Ventricle: \par (2D): RVd (2D): 2.15 cm \par IVSd 1.23 cm (0.7-1.1) \par (Mmode): \par LVPWd 1.00 cm (0.7-1.1) \par (Mmode): \par LVIDd 4.17 cm (3.4-5.7) \par (Mmode): \par LVIDs 2.55 cm \par (Mmode): \par LV FS 38.9 % (>25%) \par (Mmode): \par Relative 0.35 (<0.42) \par Wall \par Thickness \par Rel. Wall 0.48 (<0.42) \par Thickness \par Mm \par LV Mass 86.1 g/mï¿½ \par Index: \par Mmode \par \par SPECTRAL DOPPLER ANALYSIS: \par LV DIASTOLIC FUNCTION: \par MV Peak E: 0.67 m/s Decel Time: 255 msec \par MV Peak A: 0.60 m/s \par E/A Ratio: 1.13 \par \par Aortic Valve: \par AoV VMax: 1.10 m/s AoV Area, Vmax: 3.90 cmï¿½ Vmax Indx: 2.11 cmï¿½/mï¿½ \par AoV Pk Grad: 4.9 mmHg \par \par LVOT Vmax: 0.96 m/s \par LVOT VTI: 0.20 m \par LVOT Diam: 2.39 cm \par \par Mitral Valve: \par MV P1/2 Time: 73.93 msec \par MV Area, PHT: 2.98 cmï¿½ \par \par Tricuspid Valve and PA/RV Systolic Pressure: TR Max Velocity: 2.92 m/s RA \par Pressure: 3 mmHg RVSP/PASP: 37.0 mmHg \par \par Pulmonic Valve: \par PV Max Velocity: 1.05 m/s PV Max P.4 mmHg PV Mean PG: \par \par \par [id number] Porfirio Quintana M.D., Electronically signed on 8/15/2019 at 5:06:59 PM \par \par \par *** Final *** \par \par \par PORFIRIO QUINTANA MD \par This document has been electronically signed. Aug 15 2019 2:45PM \par \par   \par \par 19 EKG - NSR\par \par Diagnostic work up during hospitalization \par b/l LE duplex - Neg\par Brain MRI - Neg\par CTA Brain/Neck Neg\par EEG - Neg\par

## 2019-10-18 NOTE — REVIEW OF SYSTEMS
[Negative] : Psychiatric [Fever] : no fever [Feeling Poorly] : not feeling poorly [Feeling Tired] : not feeling tired [Chest Pain] : no chest pain [Palpitations] : no palpitations [Lower Ext Edema] : no extremity edema [Shortness Of Breath] : no shortness of breath [Wheezing] : no wheezing [SOB on Exertion] : no shortness of breath during exertion [Cough] : no cough [Confused] : no confusion [Dizziness] : no dizziness [Fainting] : no fainting [Limb Weakness] : no limb weakness [Difficulty Walking] : no difficulty walking

## 2019-10-18 NOTE — ASSESSMENT
[FreeTextEntry1] : Mr. Kendrick is a 29y, M, who was referred by Dr. Quintana for possible PFO closure with Dr. Griffith.  Hx of TIA  (8/15/2019) followed by Dr. Puente, Dr. Sandoval (Hematology) and Dr. Quintana ( Cardio). 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 neuro workup was unremarkable. TTE/RON revealed small PFO. MRI brain revealed no evidence of CVA. at this time there is no indication for PFO without evidence of cryptogenic CVA; in addition at this time it is unclear diagnosis of symptomatology (in setting of extreme exhaustion, excessive caffiene). will discuss further with MDT and schedule for EP evaluation/event monitor. \par \par Dr Grier EP evaluation scheduled for Nov. 6, 2019 - Event Monitor \par f/u with Dr. Stewart \par f/u with Dr. Sandoval \par f/u with Dr. Quintana\par Continue ASA & Lipitor\par RTO after 2-4 weeks of event monitoring.\par RTO appt set for 12/4/19.

## 2019-10-18 NOTE — HISTORY OF PRESENT ILLNESS
[FreeTextEntry1] : Mr. Kendrick is a 29y, M, who was referred by Dr. Quintana for possible PFO closure with Dr. Griffith.  Hx of TIA  (8/15/2019 followed by Dr. Puente), Lupus anticoagulant positive, blood work was recently repeat appox 2 weeks ago and showed to be neg (Dr. Sandoval - Hematology).  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 approx 30 mins after experienced his right arm feeling tingly and numb .  Followed by tingly and numbness to jaw.  Pt states, he was "different",  "wasn't able to get the words out".  pt and wife are unclear about the speech aspect. The pt's wife drove pt to the ED - [facility].  On the way to the hospital, S & S resolved on its own.  Stroke protocol completed in the ED -  TTE indicated: Small patent foramen ovale, with bidirectional shunting across atrial septum.  Pt had RON as out pt with Dr. Quintana indicating: Small secundum atrial septal defect with bidirectional shunting across atrial septum.\par \par Two weeks prior to episode, pt drove 24 hrs to [place].  Pt denies any CP, dizziness, palpitations, SOB, and pain during that time.  Pt does state, he spends 12 hrs sitting in the car during his work shift.  Pt is  now on light duty, Adm.  \par \par Echo Summary:\par  1. Left ventricular ejection fraction, by visual estimation, is 55 to \par 60%.\par  2. Normal global left ventricular systolic function.\par  3. Small secundum atrial septal defect with bidirectional shunting \par across atrial septum.\par \par \par \par Hospital Course: \par Discharge Date	15-Aug-2019 \par Admission Date	15-Aug-2019 00:29 \par Reason for Admission	Change in speech, numbness of right side \par Medication Reconciliation Status	Admission Reconciliation is Completed \par Discharge Reconciliation is Completed \par Hospital Course	 \par Mr. Kendrick is a 29y old male with no past medical history who presented to the ED with a chief complaint of change in speech, right-sided arm numbness, and left sided neck pain (15 Aug 2019 04:35). Patient stated he was sitting in the car when all of a sudden he had left sided neck pain with an associated right \par upper extremity numbness and tingling. Additionally he had stuttered speech and blurred vision at the time.  When the patient arrived in the ED, stroke code was called and was given a NIHSS =0, as his symptoms resolved prior to arrival. \par  There, he was given  mg and 80 mg Lipitor and transferred to the \[facility] Neurology unit. Over the course of his  one-day hospital stay, several imaging studies were ordered such as CXR, echocardiogram, EEG, CT of head, CTA of head and neck with IV contrast, and MRI head and neck with no contrast.  All imaging studies were normal. Echo was positive for a small non patent PFO. After one hospital day, the patient was discharged and was asked to follow up with the Neurologist at [facility], Dr. Stewatr, in two weeks for further workup. \[facility]

## 2019-10-18 NOTE — REASON FOR VISIT
[Consultation] : a consultation visit [Spouse] : spouse [FreeTextEntry1] : possible PFO closure - Small PFO with bidirectional shunting across atrial septum.\par

## 2019-11-06 ENCOUNTER — APPOINTMENT (OUTPATIENT)
Dept: CARDIOLOGY | Facility: CLINIC | Age: 29
End: 2019-11-06
Payer: OTHER GOVERNMENT

## 2019-11-06 VITALS
HEIGHT: 71 IN | WEIGHT: 194 LBS | BODY MASS INDEX: 27.16 KG/M2 | SYSTOLIC BLOOD PRESSURE: 126 MMHG | HEART RATE: 80 BPM | DIASTOLIC BLOOD PRESSURE: 77 MMHG

## 2019-11-06 PROCEDURE — 99204 OFFICE O/P NEW MOD 45 MIN: CPT

## 2019-11-06 PROCEDURE — 93000 ELECTROCARDIOGRAM COMPLETE: CPT

## 2019-11-13 VITALS
BODY MASS INDEX: 27.06 KG/M2 | SYSTOLIC BLOOD PRESSURE: 126 MMHG | DIASTOLIC BLOOD PRESSURE: 77 MMHG | WEIGHT: 194 LBS | HEART RATE: 80 BPM

## 2019-11-13 NOTE — ASSESSMENT
[FreeTextEntry1] : 30 yo M who is a [employer]  with recent episode suspicious for TIA after high caffeine intake (8 shots of expresso) and no overnight sleep. His symptoms included right face numbness, right arm and right leg numbness, lasting approx 4 hours, all of which resolved prior to his arrival to ER . \par \par MRI brain/ CTA of head and neck - all negative . \par Hypercoagulable work-up : positive LA . He was evaluated by heme/onc: Planned to repeat hypercoagulable labs. \par ECHO(8/15/2019):  EF 55-60%, small secundum atrial septal defect with bidirectional shunting across atrial septum. \par RON (9/9/2019) : EF 55-60% , normal LV function , small ASD   \par He was equipped with an event monitor for 4 weeks as of 10/25/2019, which he is still wearing. \par \par Patient was seen and examined with Dr. Grier: we've reviewed 24 hour holter monitor study and event monitor for the past 11 days - there is no significant  arrhythmia's , no AFib, rare PVCs . AVG HR- 80 bpm .   \par At this time,  we advised patient to continue his current medication regimen , continue the event monitor study for a total of 4 weeks and return for follow up. He was also instructed to f/u with graciela/ Dr. Quintana, varinder/ Dr Cabezas, Neuro and Dr. Griffith as scheduled .

## 2019-11-13 NOTE — HISTORY OF PRESENT ILLNESS
[FreeTextEntry1] : \par Seen by Dr. Griffith for possible PFO closure . \par Neuro: Dr Puente \par Hematology: Dr. Cabezas \par \par 30 yo M with recent episode of TIA (8/15/2019), in hospital RON revealed a small PFO on RNO, currently wearing a 30 days event event monitor, presents for an initial evaluation/ discuss event monitor results.   \par \par Currently is feeling well . Denies CP/ SOB or palpitations. No recurrent TIA symptoms. Denies dizziness , lightheadedness , near syncope or syncope . \par

## 2019-11-13 NOTE — PHYSICAL EXAM
[General Appearance - Well Developed] : well developed [Normal Appearance] : normal appearance [General Appearance - Well Nourished] : well nourished [No Deformities] : no deformities [Normal Conjunctiva] : the conjunctiva exhibited no abnormalities [Normal Oral Mucosa] : normal oral mucosa [Normal Oropharynx] : normal oropharynx [Normal Jugular Venous A Waves Present] : normal jugular venous A waves present [No Jugular Venous Hines A Waves] : no jugular venous hines A waves [Heart Sounds] : normal S1 and S2 [Respiration, Rhythm And Depth] : normal respiratory rhythm and effort [Auscultation Breath Sounds / Voice Sounds] : lungs were clear to auscultation bilaterally [Bowel Sounds] : normal bowel sounds [Abdomen Soft] : soft [Abnormal Walk] : normal gait [Nail Clubbing] : no clubbing of the fingernails [Cyanosis, Localized] : no localized cyanosis [Skin Color & Pigmentation] : normal skin color and pigmentation [] : no rash [Oriented To Time, Place, And Person] : oriented to person, place, and time [No Anxiety] : not feeling anxious [Well Groomed] : well groomed [General Appearance - In No Acute Distress] : no acute distress [Murmurs] : no murmurs present [Heart Rate And Rhythm] : heart rate and rhythm were normal [Exaggerated Use Of Accessory Muscles For Inspiration] : no accessory muscle use [FreeTextEntry1] : No JVD

## 2019-11-13 NOTE — END OF VISIT
[FreeTextEntry3] : I was present with the nurse practitioner during the history and exam of the patient. I discussed patient's management with the NP in detail. I reviewed the NP’s note and agree with the documented findings and plan of care. I would like to take this opportunity to thank you for involving me in this patient’s care. Please do not hesitate to contact me if you have any further questions at [phone].\par

## 2019-11-22 ENCOUNTER — APPOINTMENT (OUTPATIENT)
Dept: CARDIOLOGY | Facility: CLINIC | Age: 29
End: 2019-11-22

## 2019-11-24 ENCOUNTER — TRANSCRIPTION ENCOUNTER (OUTPATIENT)
Age: 29
End: 2019-11-24

## 2019-12-04 ENCOUNTER — APPOINTMENT (OUTPATIENT)
Dept: CARDIOLOGY | Facility: CLINIC | Age: 29
End: 2019-12-04
Payer: OTHER GOVERNMENT

## 2019-12-04 ENCOUNTER — APPOINTMENT (OUTPATIENT)
Dept: CARDIOTHORACIC SURGERY | Facility: CLINIC | Age: 29
End: 2019-12-04

## 2019-12-04 VITALS
DIASTOLIC BLOOD PRESSURE: 70 MMHG | HEART RATE: 82 BPM | SYSTOLIC BLOOD PRESSURE: 110 MMHG | WEIGHT: 194 LBS | HEIGHT: 71 IN | BODY MASS INDEX: 27.16 KG/M2

## 2019-12-04 PROCEDURE — 99213 OFFICE O/P EST LOW 20 MIN: CPT

## 2019-12-04 PROCEDURE — 93000 ELECTROCARDIOGRAM COMPLETE: CPT | Mod: 59

## 2019-12-04 PROCEDURE — 93228 REMOTE 30 DAY ECG REV/REPORT: CPT

## 2019-12-04 NOTE — HISTORY OF PRESENT ILLNESS
[FreeTextEntry1] : \par Seen by Dr. Griffith for possible PFO closure . \par Neuro: Dr Puente \par Hematology: Dr. Cabezas \par \par 28 yo M with TIA (8/15/2019) and an in hospital RON that revealed a small PFO. He returns for follow up to discuss findings from recent 4 week event monitor.  \par \par He is feeling well . Denies CP/ SOB or palpitations. No recurrent TIA symptoms . Denies dizziness , lightheadedness , near syncope or syncope . \par

## 2019-12-04 NOTE — END OF VISIT
[FreeTextEntry3] : I was present with the nurse practitioner during the history and exam of the patient. I discussed patient's management with the NP in detail. I reviewed the NP’s note and agree with the documented findings and plan of care. I would like to take this opportunity to thank you for involving me in this patient’s care. Please do not hesitate to contact me if you have any further questions at [phone].\par

## 2019-12-04 NOTE — ASSESSMENT
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 His symptoms included right face numbness, right arm and right leg numbness, lasting approx 4 hours, all of which resolved prior to his arrival to ER . \par \par MRI brain/ CTA of head and neck - all negative . \par Hypercoagulable work-up : positive LA . He was evaluated by heme/onc: Planned to repeat hypercoagulable labs. \par ECHO(8/15/2019):  EF 55-60%, small secundum atrial septal defect with bidirectional shunting across atrial septum. \par RON (9/9/2019) : EF 55-60% , normal LV function , small ASD   \par Event monitor ( 10/25/2019 -11/24/2019) - NSR , AVG HR 74 bpm, rare PVCs, APCs . NO arrhythmias.\par \par Patient was seen and examined with Dr. Grier: we've reviewed  and discussed with the patient the event monitor study. There is no significant  arrhythmias , no AFib, rare PVCs and APCs . AVG HR 74-83 bpm .   \par We advised patient to continue his current medication regimen. f/u with cardio/ Dr. Quintana, heme onc/ Dr Cabezas, Neuro and Dr. Griffith as scheduled . If recurrent TIA/CVA or palpitations, would recommend ILR .

## 2019-12-04 NOTE — PHYSICAL EXAM
[General Appearance - Well Developed] : well developed [Normal Appearance] : normal appearance [General Appearance - Well Nourished] : well nourished [No Deformities] : no deformities [Normal Oropharynx] : normal oropharynx [Auscultation Breath Sounds / Voice Sounds] : lungs were clear to auscultation bilaterally [Respiration, Rhythm And Depth] : normal respiratory rhythm and effort [Heart Rate And Rhythm] : heart rate and rhythm were normal [Heart Sounds] : normal S1 and S2 [Abdomen Soft] : soft [Bowel Sounds] : normal bowel sounds [Cyanosis, Localized] : no localized cyanosis [Abnormal Walk] : normal gait [Nail Clubbing] : no clubbing of the fingernails [Skin Color & Pigmentation] : normal skin color and pigmentation [Oriented To Time, Place, And Person] : oriented to person, place, and time [] : no rash [Skin Lesions] : no skin lesions [No Anxiety] : not feeling anxious [Well Groomed] : well groomed [General Appearance - In No Acute Distress] : no acute distress [Normal Conjunctiva] : the conjunctiva exhibited no abnormalities [Exaggerated Use Of Accessory Muscles For Inspiration] : no accessory muscle use [Murmurs] : no murmurs present [FreeTextEntry1] : No JVD

## 2019-12-16 ENCOUNTER — APPOINTMENT (OUTPATIENT)
Dept: CARDIOTHORACIC SURGERY | Facility: CLINIC | Age: 29
End: 2019-12-16
Payer: OTHER GOVERNMENT

## 2019-12-16 PROCEDURE — 99214 OFFICE O/P EST MOD 30 MIN: CPT

## 2019-12-18 ENCOUNTER — APPOINTMENT (OUTPATIENT)
Dept: NEUROLOGY | Facility: CLINIC | Age: 29
End: 2019-12-18
Payer: OTHER GOVERNMENT

## 2019-12-18 VITALS
BODY MASS INDEX: 27.16 KG/M2 | HEART RATE: 86 BPM | SYSTOLIC BLOOD PRESSURE: 125 MMHG | HEIGHT: 71 IN | DIASTOLIC BLOOD PRESSURE: 72 MMHG | WEIGHT: 194 LBS

## 2019-12-18 PROCEDURE — 99214 OFFICE O/P EST MOD 30 MIN: CPT

## 2019-12-18 NOTE — DISCUSSION/SUMMARY
[FreeTextEntry1] : Mr. Kendrick is a 28yo man who was initially seen in [facility] on 8/14/2019 for an episode of suspected TIA.  After extensive workup it was unclear whether it was a TIA however the clinical history was more suggestive of this as he language dysfunction and right sided numbness involving the face, arm and leg.  Therefore he was placed on aspirin and a statin.  To better clarify the risk and indication for closing the PFO will send for repeat MRI brain to see if there has been any new events in the interm and also a TCD with bubble to look for both spontaneous shunting or provoked shunting up to the brain.\par 1. MRI brain w/o RELL\par 2. TCD with bubble\par 3. Continue Aspirin \par 4. Will call after studies

## 2019-12-18 NOTE — PHYSICAL EXAM
[FreeTextEntry1] : A+Ox3 language and attention normal\par Cranial nerves 2-12 normal\par No drift power 5/5 in all 4 extremities\par Temp, Vib, LT symmetric\par No neglect \par DTR 2+ throughout \par FTN NL\par Gait normal\par \par NIHSS 0\par mrankin 0\par

## 2019-12-18 NOTE — HISTORY OF PRESENT ILLNESS
[FreeTextEntry1] : Mr. Kendrick is here for follow up for possible TIA on 8/14/2019.  After thorough workup he was not found to have any abnormalities on MRI brain and with cerebral vasculature was normal and no signs of dissection.  Hypercoagulability workup was done and was inconclusive and followup testing with a hematologist was negative.  He was found to have a PFO which led his to see a CT surgeon however as he was placed on aspirin 81mg and statin and had normal MRI brain he was not deemed to be a candidate for closure.  He has not had any new episodes of any events similar to the one that night.  He admits to working more than typical and drinking a large caffeinated drink and believes it may have been related to this.

## 2019-12-20 NOTE — PHYSICAL EXAM
[Normal Appearance] : normal appearance [General Appearance - In No Acute Distress] : no acute distress [Normal Jugular Venous V Waves Present] : normal jugular venous V waves present [Exaggerated Use Of Accessory Muscles For Inspiration] : no accessory muscle use [] : no respiratory distress [Respiration, Rhythm And Depth] : normal respiratory rhythm and effort [Heart Sounds] : normal S1 and S2 [Heart Rate And Rhythm] : heart rate and rhythm were normal [Auscultation Breath Sounds / Voice Sounds] : lungs were clear to auscultation bilaterally [Murmurs] : no murmurs present [Arterial Pulses Normal] : the arterial pulses were normal [Edema] : no peripheral edema present [Bowel Sounds] : normal bowel sounds [Abdomen Tenderness] : non-tender [Abdomen Soft] : soft [Nail Clubbing] : no clubbing of the fingernails [Abnormal Walk] : normal gait [Cyanosis, Localized] : no localized cyanosis [Skin Color & Pigmentation] : normal skin color and pigmentation [Oriented To Time, Place, And Person] : oriented to person, place, and time

## 2019-12-22 NOTE — HISTORY OF PRESENT ILLNESS
[FreeTextEntry1] : 29 year old male who works in [employer] with a history of hyperlipidemia, questionable TIA in 8/2019 and a patent foramen ovale who presents for follow up. \par \par Since his last visit, the patient states he is feeling fine and has had no recurrent events. He completed an event monitor for 28 days which was negative for atrial fibrillation or any other arrhythmia. The patient completed a hypercoagulable evaluation which was negative. There was no evidence of a neurologic event on the head CT and brain MRI done during his admission in August. \par \par

## 2019-12-27 ENCOUNTER — OUTPATIENT (OUTPATIENT)
Dept: OUTPATIENT SERVICES | Facility: HOSPITAL | Age: 29
LOS: 1 days | Discharge: HOME | End: 2019-12-27
Payer: OTHER GOVERNMENT

## 2019-12-27 DIAGNOSIS — G45.9 TRANSIENT CEREBRAL ISCHEMIC ATTACK, UNSPECIFIED: ICD-10-CM

## 2019-12-27 PROCEDURE — 70551 MRI BRAIN STEM W/O DYE: CPT | Mod: 26

## 2019-12-30 ENCOUNTER — OTHER (OUTPATIENT)
Age: 29
End: 2019-12-30

## 2020-01-07 ENCOUNTER — APPOINTMENT (OUTPATIENT)
Dept: HEMATOLOGY ONCOLOGY | Facility: CLINIC | Age: 30
End: 2020-01-07
Payer: OTHER GOVERNMENT

## 2020-01-07 VITALS
WEIGHT: 195 LBS | HEART RATE: 87 BPM | BODY MASS INDEX: 27.3 KG/M2 | SYSTOLIC BLOOD PRESSURE: 115 MMHG | HEIGHT: 71 IN | RESPIRATION RATE: 12 BRPM | DIASTOLIC BLOOD PRESSURE: 59 MMHG | TEMPERATURE: 97.2 F

## 2020-01-07 PROCEDURE — 99213 OFFICE O/P EST LOW 20 MIN: CPT

## 2020-01-08 ENCOUNTER — APPOINTMENT (OUTPATIENT)
Dept: CARDIOTHORACIC SURGERY | Facility: CLINIC | Age: 30
End: 2020-01-08
Payer: OTHER GOVERNMENT

## 2020-01-08 VITALS
RESPIRATION RATE: 11 BRPM | HEART RATE: 76 BPM | SYSTOLIC BLOOD PRESSURE: 126 MMHG | TEMPERATURE: 98.3 F | OXYGEN SATURATION: 98 % | DIASTOLIC BLOOD PRESSURE: 86 MMHG

## 2020-01-08 PROCEDURE — 99213 OFFICE O/P EST LOW 20 MIN: CPT

## 2020-01-08 NOTE — ASSESSMENT
[FreeTextEntry1] : 28 y/o M with TIA. Extensive work up has been done including MRI, CTA and Holter monitoring. He was noted to have  small pfo  but as per CT surgery, no indication for closure. \par Had Positive LA testing with negative cardiolipin and beta 2 glycoprotein abs. Repeat LA was negative, \par \par #TIA : etiology unclear. ? excessive caffeine and lack of sleep could be a trigger as per neuro.\par          Recent MRI 12/2019 was negative. Dr. Trevon brock note said to do TCD with bubble study but the patient was not given any requisition. Will follow up with Dr. TREVON brock to confirm if any further testing needs to be done. \par -No further hypercoagulable work up as repeat LA was negative. \par -Agree with aspirin \par -Follow up with Neurology\par \par RTC in 6 months. \par Patient seen and examined with DR. Sandoval

## 2020-01-08 NOTE — HISTORY OF PRESENT ILLNESS
[de-identified] : 01/07/2020\par Patient returns for a follow up visit.  SInce last visit he had Holter monitoring for 4 weeks which did not reveal any Arrythmia. He was seen by DR. Griffith for PFO closure but he had negative MRI  so recommended against it. He has seen Dr. JENN brock recently and repeat MRI was negative as well. TCD was recommended but not done yet. \par Regarding hypercoagulable work up, lupus AC was negative here. \par He denies any other similar episodes. \par  [de-identified] : 30 y/o left handed man was referred by Dr Puente for further evaluation and management of positive lupus anticoagulant in the setting of a TIA. \par \par Pt had presented to ER in 8/14/2019 with expressive aphasia approx 4 hours prior to presentation accompanied by right face, arm and leg numbness, all of which resolved prior to arrival..  He was kept for workup of TIA and had an MRI brain which was negative and CTA of Head and neck which was unremarkable.  LDL was 127 and hypercoagulable labs were negative with only the DRVVT S/C ratio being positive.  Proothrombin mutation and factor V leydein were normal.  TTE with bubble showed a pfo.  RON showed small septum secondum defect. Venous duplex negative. EEG was normal.  He has had no episodes since this episode.  He feels completely normal.\par \par Pt denies having previous episode of stroke. No h/o DVT or heart disease. Family history unknown as the pt was adopted. No previous h/o cancer or recent weight loss. No joint disease or rash.

## 2020-01-09 NOTE — PHYSICAL EXAM
[Sclera] : the sclera and conjunctiva were normal [Neck Appearance] : the appearance of the neck was normal [Jugular Venous Distention Increased] : there was no jugular-venous distention [Respiration, Rhythm And Depth] : normal respiratory rhythm and effort [Exaggerated Use Of Accessory Muscles For Inspiration] : no accessory muscle use [Auscultation Breath Sounds / Voice Sounds] : lungs were clear to auscultation bilaterally [] : no respiratory distress [Heart Sounds] : normal S1 and S2 [Heart Rate And Rhythm] : heart rate was normal and rhythm regular [Heart Sounds Gallop] : no gallops [Examination Of The Chest] : the chest was normal in appearance [Murmurs] : no murmurs [Heart Sounds Pericardial Friction Rub] : no pericardial rub [Abdomen Soft] : soft [Involuntary Movements] : no involuntary movements were seen [Abnormal Walk] : normal gait [Nail Clubbing] : no clubbing  or cyanosis of the fingernails [Skin Color & Pigmentation] : normal skin color and pigmentation [Oriented To Time, Place, And Person] : oriented to person, place, and time [Motor Exam] : the motor exam was normal [Impaired Insight] : insight and judgment were intact [Affect] : the affect was normal [Mood] : the mood was normal

## 2020-01-10 ENCOUNTER — APPOINTMENT (OUTPATIENT)
Dept: CARDIOLOGY | Facility: CLINIC | Age: 30
End: 2020-01-10
Payer: OTHER GOVERNMENT

## 2020-01-10 VITALS
WEIGHT: 196 LBS | DIASTOLIC BLOOD PRESSURE: 68 MMHG | HEART RATE: 75 BPM | HEIGHT: 71 IN | SYSTOLIC BLOOD PRESSURE: 110 MMHG | BODY MASS INDEX: 27.44 KG/M2

## 2020-01-10 DIAGNOSIS — R76.0 RAISED ANTIBODY TITER: ICD-10-CM

## 2020-01-10 PROCEDURE — 93000 ELECTROCARDIOGRAM COMPLETE: CPT

## 2020-01-10 PROCEDURE — 99214 OFFICE O/P EST MOD 30 MIN: CPT

## 2020-01-10 NOTE — ASSESSMENT
[FreeTextEntry1] : 30yo male who works in the [employer] with a history of hyperlipidemia, questionable TIA in 8/2019 and a PFO/small secundum ASD who presents for follow up. The patient is clinically stable with no cardiac symptoms. The patient has undergone an extensive cardiac, hematology and neurology evaluation. To date, his neurologic workup has been negative but Dr. Hernandez feels the clinical history may be indicative of one vs paresthesia in setting of lack of sleep, extensive caffiene via energy drinks/coffe. Rpeat brain MRI did not show any evidence of CVA. Hypercoagulable work-up was negative with Dr. Sandoval and no A-fib, no arrhythmias were noted from EP, Dr. Grier.  Dr. Griffith discussed obtaining TCD to evaluate PFO with Dr. Lechuga. Dr. Griffith and Dr. Quintana will discuss the plan of care. The results and plan were discussed with the patient and his wife; all questions were answered. \par \par -Transcranial Doppler with contrast (bubble) study with Dr. Lechuga.\par -RTO after testing completed\par -Continue medication regimen\par

## 2020-01-10 NOTE — HISTORY OF PRESENT ILLNESS
[FreeTextEntry1] : 29 year old male who works in the [employer] with a history of hyperlipidemia, questionable TIA in 8/2019 and a patent foramen ovale who presents for follow up. \par \par Since his last visit, the patient states he is feeling well, no new episodes.  Pt denies weakness, speech impairments, CP, palpitations, and dizziness. He continues to work on light duty. \par \par Seen by Dr. Stewart on 12/18/19 and ordered MRI brain- completed - neg \par Seen by Dr. Sandoval 1/7/20 - labs neg.\par

## 2020-01-10 NOTE — REVIEW OF SYSTEMS
[Negative] : Psychiatric [Fever] : no fever [Chills] : no chills [Feeling Poorly] : not feeling poorly [Feeling Tired] : not feeling tired [Heart Rate Is Slow] : the heart rate was not slow [Chest Pain] : no chest pain [Heart Rate Is Fast] : the heart rate was not fast [Palpitations] : no palpitations [Shortness Of Breath] : no shortness of breath [Lower Ext Edema] : no extremity edema [Wheezing] : no wheezing [SOB on Exertion] : no shortness of breath during exertion [Cough] : no cough [Orthopnea] : no orthopnea [Confused] : no confusion [Dizziness] : no dizziness [Fainting] : no fainting [Limb Weakness] : no limb weakness [Difficulty Walking] : no difficulty walking

## 2020-01-10 NOTE — ASSESSMENT
[FreeTextEntry1] : 28 yo male with pmhx and presentation as above\par TIA\par dyslipidemia\par cont meds\par RON findings d/w pt and neuro - would likely benefit form ASD closure\par Consult with  Dr. Griffith noted\par agree with TCD\par f/up with neuro/hem\par aggressive risk modif\par diet and act as tolerated\par rtc after testing

## 2020-01-10 NOTE — PHYSICAL EXAM
[General Appearance - Well Developed] : well developed [Normal Appearance] : normal appearance [General Appearance - Well Nourished] : well nourished [Well Groomed] : well groomed [Normal Oral Mucosa] : normal oral mucosa [No Deformities] : no deformities [General Appearance - In No Acute Distress] : no acute distress [Normal Jugular Venous A Waves Present] : normal jugular venous A waves present [No Oral Cyanosis] : no oral cyanosis [No Oral Pallor] : no oral pallor [Normal Jugular Venous V Waves Present] : normal jugular venous V waves present [No Jugular Venous Hines A Waves] : no jugular venous hines A waves [Heart Rate And Rhythm] : heart rate and rhythm were normal [Heart Sounds] : normal S1 and S2 [Murmurs] : no murmurs present [Exaggerated Use Of Accessory Muscles For Inspiration] : no accessory muscle use [Auscultation Breath Sounds / Voice Sounds] : lungs were clear to auscultation bilaterally [Respiration, Rhythm And Depth] : normal respiratory rhythm and effort [Abdomen Tenderness] : non-tender [Abdomen Soft] : soft [Abdomen Mass (___ Cm)] : no abdominal mass palpated [Nail Clubbing] : no clubbing of the fingernails [Cyanosis, Localized] : no localized cyanosis [Petechial Hemorrhages (___cm)] : no petechial hemorrhages [] : no ischemic changes [Oriented To Time, Place, And Person] : oriented to person, place, and time [Skin Color & Pigmentation] : normal skin color and pigmentation

## 2020-01-10 NOTE — HISTORY OF PRESENT ILLNESS
[FreeTextEntry1] :  28 yo male with pmhx as below is here for a f/up visit\par recent admission to [facility] with TIA, RON revealed small asd with bidirectional flow\par MRI/CT head/hypercoag w/up unrem\par seen by Dr. Griffith\par scheduled for tcd\par no active cvs complains\par et is excellent\par ros is otherwise as below

## 2020-01-10 NOTE — DATA REVIEWED
[FreeTextEntry1] : \par EXAM: MR BRAIN \par \par \par PROCEDURE DATE: 12/27/2019 \par \par \par \par \par INTERPRETATION: Clinical History / Reason for exam: TIA. Right-sided \par numbness involving the face, arm and leg. \par \par TECHNIQUE: MRI brain without contrast. Multiplanar multisequential MRI of \par the brain without intravenous contrast administration on the 1.5 [place] \par magnet. \par \par Comparison: Brain MRI 8/15/2019. \par \par FINDINGS: \par \par The ventricles and cortical sulci are normal in size and configuration. \par \par There is no evidence of acute intracranial hemorrhage, extra-axial fluid \par collection or midline shift. \par \par There is no diffusion abnormality to suggest acute/subacute infarct. There \par is normal flow void present within the major vascular structures. \par \par There is polypoid mucosal thickening within the maxillary sinuses and mild \par frontal and ethmoid sinus mucosal thickening. \par \par IMPRESSION: \par \par 1. Unremarkable MRI of the brain. Stable exam since 8/15/2019. \par \par 2. Paranasal sinus inflammatory changes as described. \par \par \par BRIAN VAUGHN M.D., ATTENDING RADIOLOGIST \par This document has been electronically signed. Dec 27 2019 9:40AM \par \par \par \par \par \par \par \par

## 2020-01-13 ENCOUNTER — OUTPATIENT (OUTPATIENT)
Dept: OUTPATIENT SERVICES | Facility: HOSPITAL | Age: 30
LOS: 1 days | Discharge: HOME | End: 2020-01-13

## 2020-01-13 ENCOUNTER — RESULT REVIEW (OUTPATIENT)
Age: 30
End: 2020-01-13

## 2020-01-13 NOTE — CHART NOTE - NSCHARTNOTEFT_GEN_A_CORE
Contrast-enhanced Transcranial Doppler (TCD) Bubble Study Preliminary Report    Clinical History: TIA  Reason for Study: TCD with contrast to evaluate for right to left shunt  Date of Study: 01/13/2020  Insonated Vessels: Right MCA  Technique & Findings:  Injected agitated 9 cc saline + 1 cc air with few drops of blood into a 20G peripheral IV in left forearm and noticed 1 hit per screen in resting state without Valsalva maneuver. Then, injected agitated 9 cc saline + 1 cc air with few drops of blood and noticed > 30 hits per screen with Valsalva maneuver. This is suggestive of significant right to left shunt. Will update cardiologist Dr. Griffith and patient is to follow up with him.

## 2020-01-15 ENCOUNTER — MESSAGE (OUTPATIENT)
Age: 30
End: 2020-01-15

## 2020-01-20 DIAGNOSIS — N18.6 END STAGE RENAL DISEASE: ICD-10-CM

## 2020-01-27 ENCOUNTER — APPOINTMENT (OUTPATIENT)
Dept: CARDIOTHORACIC SURGERY | Facility: CLINIC | Age: 30
End: 2020-01-27
Payer: OTHER GOVERNMENT

## 2020-01-27 ENCOUNTER — OUTPATIENT (OUTPATIENT)
Dept: OUTPATIENT SERVICES | Facility: HOSPITAL | Age: 30
LOS: 1 days | End: 2020-01-27
Payer: OTHER GOVERNMENT

## 2020-01-27 VITALS
HEART RATE: 84 BPM | OXYGEN SATURATION: 96 % | TEMPERATURE: 97.3 F | RESPIRATION RATE: 18 BRPM | WEIGHT: 201 LBS | HEIGHT: 71 IN | DIASTOLIC BLOOD PRESSURE: 75 MMHG | SYSTOLIC BLOOD PRESSURE: 129 MMHG | BODY MASS INDEX: 28.14 KG/M2

## 2020-01-27 DIAGNOSIS — Q21.1 ATRIAL SEPTAL DEFECT: ICD-10-CM

## 2020-01-27 LAB
ALBUMIN SERPL ELPH-MCNC: 4.7 G/DL — SIGNIFICANT CHANGE UP (ref 3.3–5)
ALP SERPL-CCNC: 71 U/L — SIGNIFICANT CHANGE UP (ref 40–120)
ALT FLD-CCNC: 20 U/L — SIGNIFICANT CHANGE UP (ref 10–45)
ANION GAP SERPL CALC-SCNC: 12 MMOL/L — SIGNIFICANT CHANGE UP (ref 5–17)
APTT BLD: 33.6 SEC — SIGNIFICANT CHANGE UP (ref 27.5–36.3)
AST SERPL-CCNC: 23 U/L — SIGNIFICANT CHANGE UP (ref 10–40)
BASOPHILS # BLD AUTO: 0.06 K/UL — SIGNIFICANT CHANGE UP (ref 0–0.2)
BASOPHILS NFR BLD AUTO: 0.7 % — SIGNIFICANT CHANGE UP (ref 0–2)
BILIRUB SERPL-MCNC: 1.2 MG/DL — SIGNIFICANT CHANGE UP (ref 0.2–1.2)
BUN SERPL-MCNC: 15 MG/DL — SIGNIFICANT CHANGE UP (ref 7–23)
CALCIUM SERPL-MCNC: 9.6 MG/DL — SIGNIFICANT CHANGE UP (ref 8.4–10.5)
CHLORIDE SERPL-SCNC: 103 MMOL/L — SIGNIFICANT CHANGE UP (ref 96–108)
CO2 SERPL-SCNC: 26 MMOL/L — SIGNIFICANT CHANGE UP (ref 22–31)
CREAT SERPL-MCNC: 0.91 MG/DL — SIGNIFICANT CHANGE UP (ref 0.5–1.3)
EOSINOPHIL # BLD AUTO: 0.33 K/UL — SIGNIFICANT CHANGE UP (ref 0–0.5)
EOSINOPHIL NFR BLD AUTO: 3.9 % — SIGNIFICANT CHANGE UP (ref 0–6)
GLUCOSE SERPL-MCNC: 67 MG/DL — LOW (ref 70–99)
HCT VFR BLD CALC: 47.9 % — SIGNIFICANT CHANGE UP (ref 39–50)
HGB BLD-MCNC: 15.2 G/DL — SIGNIFICANT CHANGE UP (ref 13–17)
IMM GRANULOCYTES NFR BLD AUTO: 0.2 % — SIGNIFICANT CHANGE UP (ref 0–1.5)
INR BLD: 1.08 — SIGNIFICANT CHANGE UP (ref 0.88–1.16)
LYMPHOCYTES # BLD AUTO: 2.35 K/UL — SIGNIFICANT CHANGE UP (ref 1–3.3)
LYMPHOCYTES # BLD AUTO: 28 % — SIGNIFICANT CHANGE UP (ref 13–44)
MCHC RBC-ENTMCNC: 27.9 PG — SIGNIFICANT CHANGE UP (ref 27–34)
MCHC RBC-ENTMCNC: 31.7 GM/DL — LOW (ref 32–36)
MCV RBC AUTO: 87.9 FL — SIGNIFICANT CHANGE UP (ref 80–100)
MONOCYTES # BLD AUTO: 0.62 K/UL — SIGNIFICANT CHANGE UP (ref 0–0.9)
MONOCYTES NFR BLD AUTO: 7.4 % — SIGNIFICANT CHANGE UP (ref 2–14)
NEUTROPHILS # BLD AUTO: 5.02 K/UL — SIGNIFICANT CHANGE UP (ref 1.8–7.4)
NEUTROPHILS NFR BLD AUTO: 59.8 % — SIGNIFICANT CHANGE UP (ref 43–77)
NRBC # BLD: 0 /100 WBCS — SIGNIFICANT CHANGE UP (ref 0–0)
PLATELET # BLD AUTO: 295 K/UL — SIGNIFICANT CHANGE UP (ref 150–400)
POTASSIUM SERPL-MCNC: 4.2 MMOL/L — SIGNIFICANT CHANGE UP (ref 3.5–5.3)
POTASSIUM SERPL-SCNC: 4.2 MMOL/L — SIGNIFICANT CHANGE UP (ref 3.5–5.3)
PROT SERPL-MCNC: 7.4 G/DL — SIGNIFICANT CHANGE UP (ref 6–8.3)
PROTHROM AB SERPL-ACNC: 12.3 SEC — SIGNIFICANT CHANGE UP (ref 10–12.9)
RBC # BLD: 5.45 M/UL — SIGNIFICANT CHANGE UP (ref 4.2–5.8)
RBC # FLD: 12.8 % — SIGNIFICANT CHANGE UP (ref 10.3–14.5)
SODIUM SERPL-SCNC: 141 MMOL/L — SIGNIFICANT CHANGE UP (ref 135–145)
WBC # BLD: 8.4 K/UL — SIGNIFICANT CHANGE UP (ref 3.8–10.5)
WBC # FLD AUTO: 8.4 K/UL — SIGNIFICANT CHANGE UP (ref 3.8–10.5)

## 2020-01-27 PROCEDURE — 36415 COLL VENOUS BLD VENIPUNCTURE: CPT

## 2020-01-27 PROCEDURE — 99215 OFFICE O/P EST HI 40 MIN: CPT

## 2020-01-28 NOTE — PHYSICAL EXAM
[Normal Conjunctiva] : the conjunctiva exhibited no abnormalities [General Appearance - In No Acute Distress] : no acute distress [Normal Jugular Venous A Waves Present] : normal jugular venous A waves present [Normal Jugular Venous V Waves Present] : normal jugular venous V waves present [Respiration, Rhythm And Depth] : normal respiratory rhythm and effort [Heart Rate And Rhythm] : heart rate and rhythm were normal [Exaggerated Use Of Accessory Muscles For Inspiration] : no accessory muscle use [Auscultation Breath Sounds / Voice Sounds] : lungs were clear to auscultation bilaterally [Murmurs] : no murmurs present [Heart Sounds] : normal S1 and S2 [Bowel Sounds] : normal bowel sounds [Edema] : no peripheral edema present [Abdomen Soft] : soft [Abdomen Tenderness] : non-tender [Cyanosis, Localized] : no localized cyanosis [] : no rash [Abnormal Walk] : normal gait [Oriented To Time, Place, And Person] : oriented to person, place, and time

## 2020-01-28 NOTE — HISTORY OF PRESENT ILLNESS
[FreeTextEntry1] : 29 year old male with a history of hyperlipidemia, TIA and atrial septal defect who presents for follow up after testing. \par \par Since his last visit, the patient states he is feeling well with no complaints. He denies any SOB at rest or with exertion, chest pain, palpitations, dizziness, syncope, or any new neurological events. \par \par He recently underwent transcranial dopplers on 01/13/2020 which was positive and showed a significant right to left shunt.

## 2020-02-06 ENCOUNTER — INPATIENT (INPATIENT)
Facility: HOSPITAL | Age: 30
LOS: 0 days | Discharge: HOME | DRG: 274 | End: 2020-02-07
Attending: INTERNAL MEDICINE | Admitting: INTERNAL MEDICINE
Payer: OTHER GOVERNMENT

## 2020-02-06 ENCOUNTER — APPOINTMENT (OUTPATIENT)
Dept: CARDIOTHORACIC SURGERY | Facility: HOSPITAL | Age: 30
End: 2020-02-06
Payer: OTHER GOVERNMENT

## 2020-02-06 VITALS
DIASTOLIC BLOOD PRESSURE: 70 MMHG | SYSTOLIC BLOOD PRESSURE: 142 MMHG | HEART RATE: 86 BPM | RESPIRATION RATE: 18 BRPM | WEIGHT: 194.23 LBS | HEIGHT: 71 IN | OXYGEN SATURATION: 100 % | TEMPERATURE: 98 F

## 2020-02-06 LAB
ALBUMIN SERPL ELPH-MCNC: 4.4 G/DL — SIGNIFICANT CHANGE UP (ref 3.3–5)
ALP SERPL-CCNC: 68 U/L — SIGNIFICANT CHANGE UP (ref 40–120)
ALT FLD-CCNC: 13 U/L — SIGNIFICANT CHANGE UP (ref 10–45)
ANION GAP SERPL CALC-SCNC: 12 MMOL/L — SIGNIFICANT CHANGE UP (ref 5–17)
ANION GAP SERPL CALC-SCNC: 14 MMOL/L — SIGNIFICANT CHANGE UP (ref 5–17)
APTT BLD: 35.3 SEC — SIGNIFICANT CHANGE UP (ref 27.5–36.3)
APTT BLD: 39.8 SEC — HIGH (ref 27.5–36.3)
AST SERPL-CCNC: 19 U/L — SIGNIFICANT CHANGE UP (ref 10–40)
BASOPHILS # BLD AUTO: 0.05 K/UL — SIGNIFICANT CHANGE UP (ref 0–0.2)
BASOPHILS NFR BLD AUTO: 0.5 % — SIGNIFICANT CHANGE UP (ref 0–2)
BILIRUB SERPL-MCNC: 1.8 MG/DL — HIGH (ref 0.2–1.2)
BLD GP AB SCN SERPL QL: NEGATIVE — SIGNIFICANT CHANGE UP
BUN SERPL-MCNC: 11 MG/DL — SIGNIFICANT CHANGE UP (ref 7–23)
BUN SERPL-MCNC: 12 MG/DL — SIGNIFICANT CHANGE UP (ref 7–23)
CALCIUM SERPL-MCNC: 9.1 MG/DL — SIGNIFICANT CHANGE UP (ref 8.4–10.5)
CALCIUM SERPL-MCNC: 9.5 MG/DL — SIGNIFICANT CHANGE UP (ref 8.4–10.5)
CHLORIDE SERPL-SCNC: 103 MMOL/L — SIGNIFICANT CHANGE UP (ref 96–108)
CHLORIDE SERPL-SCNC: 104 MMOL/L — SIGNIFICANT CHANGE UP (ref 96–108)
CO2 SERPL-SCNC: 21 MMOL/L — LOW (ref 22–31)
CO2 SERPL-SCNC: 26 MMOL/L — SIGNIFICANT CHANGE UP (ref 22–31)
CREAT SERPL-MCNC: 0.92 MG/DL — SIGNIFICANT CHANGE UP (ref 0.5–1.3)
CREAT SERPL-MCNC: 0.98 MG/DL — SIGNIFICANT CHANGE UP (ref 0.5–1.3)
EOSINOPHIL # BLD AUTO: 0.14 K/UL — SIGNIFICANT CHANGE UP (ref 0–0.5)
EOSINOPHIL NFR BLD AUTO: 1.3 % — SIGNIFICANT CHANGE UP (ref 0–6)
GLUCOSE SERPL-MCNC: 100 MG/DL — HIGH (ref 70–99)
GLUCOSE SERPL-MCNC: 88 MG/DL — SIGNIFICANT CHANGE UP (ref 70–99)
HCT VFR BLD CALC: 37.2 % — LOW (ref 39–50)
HCT VFR BLD CALC: 45.5 % — SIGNIFICANT CHANGE UP (ref 39–50)
HGB BLD-MCNC: 12 G/DL — LOW (ref 13–17)
HGB BLD-MCNC: 14.9 G/DL — SIGNIFICANT CHANGE UP (ref 13–17)
IMM GRANULOCYTES NFR BLD AUTO: 0.4 % — SIGNIFICANT CHANGE UP (ref 0–1.5)
INR BLD: 1.13 — SIGNIFICANT CHANGE UP (ref 0.88–1.16)
INR BLD: 1.24 — HIGH (ref 0.88–1.16)
LYMPHOCYTES # BLD AUTO: 1.11 K/UL — SIGNIFICANT CHANGE UP (ref 1–3.3)
LYMPHOCYTES # BLD AUTO: 10.1 % — LOW (ref 13–44)
MAGNESIUM SERPL-MCNC: 1.8 MG/DL — SIGNIFICANT CHANGE UP (ref 1.6–2.6)
MCHC RBC-ENTMCNC: 28.5 PG — SIGNIFICANT CHANGE UP (ref 27–34)
MCHC RBC-ENTMCNC: 28.5 PG — SIGNIFICANT CHANGE UP (ref 27–34)
MCHC RBC-ENTMCNC: 32.3 GM/DL — SIGNIFICANT CHANGE UP (ref 32–36)
MCHC RBC-ENTMCNC: 32.7 GM/DL — SIGNIFICANT CHANGE UP (ref 32–36)
MCV RBC AUTO: 87.2 FL — SIGNIFICANT CHANGE UP (ref 80–100)
MCV RBC AUTO: 88.4 FL — SIGNIFICANT CHANGE UP (ref 80–100)
MONOCYTES # BLD AUTO: 0.29 K/UL — SIGNIFICANT CHANGE UP (ref 0–0.9)
MONOCYTES NFR BLD AUTO: 2.6 % — SIGNIFICANT CHANGE UP (ref 2–14)
NEUTROPHILS # BLD AUTO: 9.38 K/UL — HIGH (ref 1.8–7.4)
NEUTROPHILS NFR BLD AUTO: 85.1 % — HIGH (ref 43–77)
NRBC # BLD: 0 /100 WBCS — SIGNIFICANT CHANGE UP (ref 0–0)
NRBC # BLD: 0 /100 WBCS — SIGNIFICANT CHANGE UP (ref 0–0)
NT-PROBNP SERPL-SCNC: 45 PG/ML — SIGNIFICANT CHANGE UP (ref 0–300)
PHOSPHATE SERPL-MCNC: 3.2 MG/DL — SIGNIFICANT CHANGE UP (ref 2.5–4.5)
PLATELET # BLD AUTO: 226 K/UL — SIGNIFICANT CHANGE UP (ref 150–400)
PLATELET # BLD AUTO: 275 K/UL — SIGNIFICANT CHANGE UP (ref 150–400)
POTASSIUM SERPL-MCNC: 4 MMOL/L — SIGNIFICANT CHANGE UP (ref 3.5–5.3)
POTASSIUM SERPL-MCNC: 4.3 MMOL/L — SIGNIFICANT CHANGE UP (ref 3.5–5.3)
POTASSIUM SERPL-SCNC: 4 MMOL/L — SIGNIFICANT CHANGE UP (ref 3.5–5.3)
POTASSIUM SERPL-SCNC: 4.3 MMOL/L — SIGNIFICANT CHANGE UP (ref 3.5–5.3)
PROT SERPL-MCNC: 6.6 G/DL — SIGNIFICANT CHANGE UP (ref 6–8.3)
PROTHROM AB SERPL-ACNC: 12.9 SEC — SIGNIFICANT CHANGE UP (ref 10–12.9)
PROTHROM AB SERPL-ACNC: 14.2 SEC — HIGH (ref 10–12.9)
RBC # BLD: 4.21 M/UL — SIGNIFICANT CHANGE UP (ref 4.2–5.8)
RBC # BLD: 5.22 M/UL — SIGNIFICANT CHANGE UP (ref 4.2–5.8)
RBC # FLD: 12.6 % — SIGNIFICANT CHANGE UP (ref 10.3–14.5)
RBC # FLD: 12.6 % — SIGNIFICANT CHANGE UP (ref 10.3–14.5)
RH IG SCN BLD-IMP: POSITIVE — SIGNIFICANT CHANGE UP
SODIUM SERPL-SCNC: 138 MMOL/L — SIGNIFICANT CHANGE UP (ref 135–145)
SODIUM SERPL-SCNC: 142 MMOL/L — SIGNIFICANT CHANGE UP (ref 135–145)
WBC # BLD: 11.01 K/UL — HIGH (ref 3.8–10.5)
WBC # BLD: 13.98 K/UL — HIGH (ref 3.8–10.5)
WBC # FLD AUTO: 11.01 K/UL — HIGH (ref 3.8–10.5)
WBC # FLD AUTO: 13.98 K/UL — HIGH (ref 3.8–10.5)

## 2020-02-06 PROCEDURE — 86850 RBC ANTIBODY SCREEN: CPT

## 2020-02-06 PROCEDURE — 80053 COMPREHEN METABOLIC PANEL: CPT

## 2020-02-06 PROCEDURE — 93580 TRANSCATH CLOSURE OF ASD: CPT

## 2020-02-06 PROCEDURE — 85730 THROMBOPLASTIN TIME PARTIAL: CPT

## 2020-02-06 PROCEDURE — 85025 COMPLETE CBC W/AUTO DIFF WBC: CPT

## 2020-02-06 PROCEDURE — 99291 CRITICAL CARE FIRST HOUR: CPT

## 2020-02-06 PROCEDURE — 85610 PROTHROMBIN TIME: CPT

## 2020-02-06 PROCEDURE — 36415 COLL VENOUS BLD VENIPUNCTURE: CPT

## 2020-02-06 PROCEDURE — 93010 ELECTROCARDIOGRAM REPORT: CPT

## 2020-02-06 PROCEDURE — 93355 ECHO TRANSESOPHAGEAL (TEE): CPT | Mod: 26

## 2020-02-06 PROCEDURE — 71045 X-RAY EXAM CHEST 1 VIEW: CPT | Mod: 26

## 2020-02-06 PROCEDURE — 86901 BLOOD TYPING SEROLOGIC RH(D): CPT

## 2020-02-06 RX ORDER — CLOPIDOGREL BISULFATE 75 MG/1
300 TABLET, FILM COATED ORAL ONCE
Refills: 0 | Status: COMPLETED | OUTPATIENT
Start: 2020-02-06 | End: 2020-02-07

## 2020-02-06 RX ORDER — HEPARIN SODIUM 5000 [USP'U]/ML
5000 INJECTION INTRAVENOUS; SUBCUTANEOUS EVERY 8 HOURS
Refills: 0 | Status: DISCONTINUED | OUTPATIENT
Start: 2020-02-06 | End: 2020-02-07

## 2020-02-06 RX ORDER — SODIUM CHLORIDE 9 MG/ML
1000 INJECTION INTRAMUSCULAR; INTRAVENOUS; SUBCUTANEOUS
Refills: 0 | Status: DISCONTINUED | OUTPATIENT
Start: 2020-02-06 | End: 2020-02-07

## 2020-02-06 RX ORDER — ACETAMINOPHEN 500 MG
650 TABLET ORAL EVERY 6 HOURS
Refills: 0 | Status: DISCONTINUED | OUTPATIENT
Start: 2020-02-06 | End: 2020-02-07

## 2020-02-06 RX ORDER — ASPIRIN/CALCIUM CARB/MAGNESIUM 324 MG
81 TABLET ORAL DAILY
Refills: 0 | Status: DISCONTINUED | OUTPATIENT
Start: 2020-02-07 | End: 2020-02-07

## 2020-02-06 RX ORDER — CEFAZOLIN SODIUM 1 G
2000 VIAL (EA) INJECTION EVERY 8 HOURS
Refills: 0 | Status: COMPLETED | OUTPATIENT
Start: 2020-02-06 | End: 2020-02-07

## 2020-02-06 RX ORDER — CLOPIDOGREL BISULFATE 75 MG/1
75 TABLET, FILM COATED ORAL DAILY
Refills: 0 | Status: DISCONTINUED | OUTPATIENT
Start: 2020-02-07 | End: 2020-02-07

## 2020-02-06 RX ORDER — PANTOPRAZOLE SODIUM 20 MG/1
40 TABLET, DELAYED RELEASE ORAL ONCE
Refills: 0 | Status: COMPLETED | OUTPATIENT
Start: 2020-02-06 | End: 2020-02-07

## 2020-02-06 RX ORDER — MAGNESIUM SULFATE 500 MG/ML
2 VIAL (ML) INJECTION ONCE
Refills: 0 | Status: COMPLETED | OUTPATIENT
Start: 2020-02-06 | End: 2020-02-06

## 2020-02-06 RX ORDER — LIDOCAINE 4 G/100G
1 CREAM TOPICAL DAILY
Refills: 0 | Status: DISCONTINUED | OUTPATIENT
Start: 2020-02-06 | End: 2020-02-07

## 2020-02-06 RX ORDER — BUPIVACAINE 13.3 MG/ML
20 INJECTION, SUSPENSION, LIPOSOMAL INFILTRATION ONCE
Refills: 0 | Status: DISCONTINUED | OUTPATIENT
Start: 2020-02-06 | End: 2020-02-07

## 2020-02-06 RX ORDER — ASPIRIN/CALCIUM CARB/MAGNESIUM 324 MG
81 TABLET ORAL ONCE
Refills: 0 | Status: COMPLETED | OUTPATIENT
Start: 2020-02-06 | End: 2020-02-06

## 2020-02-06 RX ADMIN — Medication 50 GRAM(S): at 21:34

## 2020-02-06 RX ADMIN — Medication 100 MILLIGRAM(S): at 21:35

## 2020-02-06 RX ADMIN — Medication 81 MILLIGRAM(S): at 17:49

## 2020-02-06 NOTE — H&P ADULT - ASSESSMENT
30 y/o male who works in [employer] with hx HLD, TIA in 8/2019, and PFO.  Pt feels well.  No episodes unilateral weakness, syncope, visual changes, slurred speech.  Pt seen by Dr. Stewart on 12/18/19 and MRI brain negative.  Pt seen by Dr. Sandoval on 1/7/20 and no significant findings.  Pt presents today for percutaneous PFO closure.  No changes in current medical condition.  No recent fevers, chills, illnesses.  Pt has been NPO since midnight.  Procedure, risks, expectations, recovery discussed with patient and consent obtained.      Plan:  Problem 1:  PFO  - Procedure, risks, expectations, recovery discussed with patient, consent obtained  - Plavix 300mg tonight when patient can take PO  - ASA 81mg / Plavix 75mg po daily to start 2/7  - TTE in AM 2/7    Problem 2: HLD  - Restart Lipitor post operatively    Problem 3: Hx TIA  - Monitor vitals and neuro checks closely post op      I have reviewed clinical labs tests and reports, radiology tests and reports, as well as old patient medical records, and discussed with the refering physician. 28 y/o male who works in [employer] with hx HLD, TIA in 8/2019, and ASD.  Pt feels well.  No episodes unilateral weakness, syncope, visual changes, slurred speech.  Pt seen by Dr. Stewart on 12/18/19 and MRI brain negative.  Pt seen by Dr. Sandoval on 1/7/20 and no significant findings.  Pt presents today for percutaneous PFO closure.  No changes in current medical condition.  No recent fevers, chills, illnesses.  Pt has been NPO since midnight.  Procedure, risks, expectations, recovery discussed with patient and consent obtained.      Plan:  Problem 1:  ASD  - Procedure, risks, expectations, recovery discussed with patient, consent obtained  - Plavix 300mg tonight when patient can take PO  - ASA 81mg / Plavix 75mg po daily to start 2/7  - TTE in AM 2/7    Problem 2: HLD  - Restart Lipitor post operatively    Problem 3: Hx TIA  - Monitor vitals and neuro checks closely post op      I have reviewed clinical labs tests and reports, radiology tests and reports, as well as old patient medical records, and discussed with the refering physician.

## 2020-02-06 NOTE — PRE-OP CHECKLIST - WEIGHT IN LBS
ASTHMA ACTION PLAN for Kaur Richardson     : 1975     Date: 25  Doctor:  Wang Jensen DO  Phone for doctor or clinic: [facility], [address] [address] [phone]      ACT Score: 25    ACT Goal: 20 or greater    Call your provider if you require your rescue/quick reliever medication more than 2-3 times in a 24 hour period.    If you require your rescue inhaler/medication more than 2-3 times weekly, your asthma may not be under proper control and you should seek medical attention.    *Quick Relievers are Xopenex and Albuterol*    You can use the colors of a traffic light to help learn about your asthma medicines.  Therapy Range       1. Green - Go! % of Personal Best Peak Flow   Use controller medicine.   Breathing is good  No cough or wheeze  Can work and play Medicine How much to take When to take it    Medications       Sympathomimetics Instructions     albuterol 108 (90 Base) MCG/ACT Inhalation Aero Soln Inhale 2 puffs into the lungs every 6 (six) hours as needed for Wheezing.                    2. Yellow - Caution. 50-79% Personal Best Peak Flow  Use reliever medicine to keep an asthma attack from getting bad.   Cough  Quick Relievers  Wheezing  Tight Chest  Wake up at night Medicine How much to take When to take it    If symptoms are not improving in 24-48 hrs, call office for further instructions  Medications       Sympathomimetics Instructions     albuterol 108 (90 Base) MCG/ACT Inhalation Aero Soln Inhale 2 puffs into the lungs every 6 (six) hours as needed for Wheezing.                    3. Red - Stop! Danger! <50% Personal Best Peak Flow  Continue Controller Medications But ADD:   Medicine not helping  Breathing is hard and fast  Nose opens wide  Can't walk  Ribs show  Can't talk well Medicine How much to take When to take it    If your symptoms do not improve in ONE hour -  go to the emergency room or call  911 immediately! If symptoms improve, call office for appointment immediately.    Albuterol inhaler 2 puffs every 20 minutes for three treatments       Don't forget:  Rinse mouth after using inhaler  Use spacer for inhaler  Remember to get your Flu vaccine every fall!    [x] Asthma Action Plan reviewed with the caregiver and patient, and a copy of the plan was given to the patient/caregiver.   [] Asthma Action Plan reviewed with the caregiver and patient on the phone, and copy mailed to patient/caregiver or sent via [employer].     Signatures:     Provider  Wang Jensen, DO Patient  Kaur Richardson Caretaker          194.2

## 2020-02-06 NOTE — H&P ADULT - NSICDXPASTMEDICALHX_GEN_ALL_CORE_FT
PAST MEDICAL HISTORY:  Atherogenic dyslipidemia     History of TIA (transient ischemic attack)     PFO (patent foramen ovale)

## 2020-02-06 NOTE — BRIEF OPERATIVE NOTE - COMMENTS
Shari Lazo PA-C was the first assistant for this case including but not limited to femoral access, wire placement, device deployment.  I was present for this procedure and participated as first assistant as described by the PA above, unless otherwise noted below.

## 2020-02-06 NOTE — H&P ADULT - NSHPREVIEWOFSYSTEMS_GEN_ALL_CORE
Review of Systems  CONSTITUTIONAL:  Denies Fevers / chills, sweats, fatigue, weight loss, weight gain                                      NEURO:  Denies paresthesias, seizures, syncope, confusion                                                                                EYES:  Denies Blurry vision, discharge, pain, loss of vision                                                                                    ENMT:  Denies Difficulty hearing, vertigo, dysphagia, epistaxis, recent dental work                                       CV:  Denies Chest pain, palpitations, MARTINEZ, orthopnea                                                                                          RESPIRATORY:  Denies Wheezing, SOB, cough / sputum, hemoptysis                                                                GI:  Denies Nausea, vomiting, diarrhea, constipation, melena, difficulty swallowing                                               : Denies Hematuria, dysuria, urgency, incontinence                                                                                         MUSCULOSKELETAL:  Denies arthritis, joint swelling, muscle weakness                                                             SKIN/BREAST:  Denies rash, itching, hair loss, masses                                                                                            PSYCH:  Denies depression, anxiety, suicidal ideation                                                                                               HEME/LYMPH:  Denies bruises easily, enlarged lymph nodes, tender lymph nodes                                        ENDOCRINE:  Denies cold intolerance, heat intolerance, polydipsia

## 2020-02-06 NOTE — H&P ADULT - NSHPSOCIALHISTORY_GEN_ALL_CORE
Tobacco: denies  ETOH: social  Illicit Drugs: denies  Works in [employer]  Independent in ADLs

## 2020-02-06 NOTE — H&P ADULT - NSHPPHYSICALEXAM_GEN_ALL_CORE
Physical Exam  CONSTITUTIONAL:                                                             NAD  NEURO:                                                                      A&O x 3 no focal defects                     EYES:                                                                                WNL  ENMT:                                                                               WNL  CV:                                                                                   RRR no M/R/G  RESPIRATORY:                                                                 CTA bilaterally   GI:                                                                                     WNL  : DICKERSON + / -                                                                  no dickerson  MUSKULOSKELETAL:                                                       WNL  SKIN / BREAST:                                                                  WNL

## 2020-02-06 NOTE — H&P ADULT - ATTENDING COMMENTS
30 y/o male who works in [employer] with hx HLD, TIA in 8/2019, and identified ASD with very postiive TCD. Paitent was evaluated by MDT including neuro and hematology - no hypercoaguable state, neuro event concerning for TIA with MRI brain negative.  AFter long discussions and with TCD being markedly positive - RON reevaluated with secundum type ASD. Decision was made for closure. exam unremarkable aaox3, neuro intact no mrg rr. imp: secundum type ASD; TIA likely paradoxical embolism.  Procedure, risks, expectations, recovery discussed with patient and consent obtained.    -percutnaeous ASD closure; full RON prior to intervention on the table  -asa/plavix

## 2020-02-06 NOTE — PROGRESS NOTE ADULT - SUBJECTIVE AND OBJECTIVE BOX
CTICU  CRITICAL  CARE  attending     Hand off received 					   Pertinent clinical, laboratory, radiographic, hemodynamic, echocardiographic, respiratory data, microbiologic data and chart were reviewed and analyzed frequently throughout the course of the day and night    Patient seen and examined with CTS/ SH attending at bedside    30 y/o male who works in [employer] with hx HLD, TIA in 8/2019, and PFO.  Pt feels well.  No episodes unilateral weakness, syncope, visual changes, slurred speech.  Pt seen by Dr. Stewart on 12/18/19 and MRI brain negative.  Pt seen by Dr. Sandoval on 1/7/20 and no significant findings.  Pt presents today for percutaneous PFO closure.  No changes in current medical condition.  No recent fevers, chills, illnesses.  Pt has been NPO since midnight.  Procedure, risks, expectations, recovery discussed with patient and consent obtained.     FAMILY HISTORY:  No pertinent family history in first degree relatives: ADOPTED, UNKNOWN OF ANY FAMILY HISTORY  PAST MEDICAL & SURGICAL HISTORY:  PFO (patent foramen ovale)  Atherogenic dyslipidemia  History of TIA (transient ischemic attack)  No significant past surgical history         14 system review was unremarkable    Vital signs, hemodynamic and respiratory parameters were reviewed from the bedside nursing flowsheet.  ICU Vital Signs Last 24 Hrs  T(C): 36.3 (06 Feb 2020 21:56), Max: 37.2 (06 Feb 2020 19:45)  T(F): 97.3 (06 Feb 2020 21:56), Max: 99 (06 Feb 2020 19:45)  HR: 71 (06 Feb 2020 23:30) (68 - 86)  BP: 118/61 (06 Feb 2020 23:30) (112/61 - 142/70)  BP(mean): 83 (06 Feb 2020 23:30) (79 - 91)  ABP: --  ABP(mean): --  RR: 15 (06 Feb 2020 23:30) (15 - 18)  SpO2: 99% (06 Feb 2020 23:30) (98% - 100%)    Adult Advanced Hemodynamics Last 24 Hrs  CVP(mm Hg): --  CVP(cm H2O): --  CO: --  CI: --  PA: --  PA(mean): --  PCWP: --  SVR: --  SVRI: --  PVR: --  PVRI: --,     Intake and output was reviewed and the fluid balance was calculated  Daily Height in cm: 180.34 (06 Feb 2020 08:50)    Daily   I&O's Summary      All lines and drain sites were assessed    Neuro: No change in the mental status from the baseline. Moves all 4 extremities.  Neck: No JVD.  CVS: S1, S1, No S3.  Lungs: Good air entry bilaterally.   Abd: Soft. No tenderness. + Bowel sounds.  Vascular: + DP/PT.  Extremities: No edema.  Lymphatic: Normal.  Skin: No abnormalities.      labs  CBC Full  -  ( 06 Feb 2020 20:01 )  WBC Count : 11.01 K/uL  RBC Count : 4.21 M/uL  Hemoglobin : 12.0 g/dL  Hematocrit : 37.2 %  Platelet Count - Automated : 226 K/uL  Mean Cell Volume : 88.4 fl  Mean Cell Hemoglobin : 28.5 pg  Mean Cell Hemoglobin Concentration : 32.3 gm/dL  Auto Neutrophil # : 9.38 K/uL  Auto Lymphocyte # : 1.11 K/uL  Auto Monocyte # : 0.29 K/uL  Auto Eosinophil # : 0.14 K/uL  Auto Basophil # : 0.05 K/uL  Auto Neutrophil % : 85.1 %  Auto Lymphocyte % : 10.1 %  Auto Monocyte % : 2.6 %  Auto Eosinophil % : 1.3 %  Auto Basophil % : 0.5 %    02-06    138  |  103  |  11  ----------------------------<  100<H>  4.3   |  21<L>  |  0.92    Ca    9.1      06 Feb 2020 20:01  Phos  3.2     02-06  Mg     1.8     02-06    TPro  6.6  /  Alb  4.4  /  TBili  1.8<H>  /  DBili  x   /  AST  19  /  ALT  13  /  AlkPhos  68  02-06    PT/INR - ( 06 Feb 2020 20:01 )   PT: 14.2 sec;   INR: 1.24          PTT - ( 06 Feb 2020 20:01 )  PTT:39.8 sec  The current medications were reviewed   MEDICATIONS  (STANDING):  BUpivacaine liposome 1.3% Injectable (no eMAR) 20 milliLiter(s) Local Injection once  ceFAZolin   IVPB 2000 milliGRAM(s) IV Intermittent every 8 hours  clopidogrel Tablet 300 milliGRAM(s) Oral once  heparin  Injectable 5000 Unit(s) SubCutaneous every 8 hours  lidocaine   Patch 1 Patch Transdermal daily  pantoprazole    Tablet 40 milliGRAM(s) Oral once  sodium chloride 0.9%. 1000 milliLiter(s) (75 mL/Hr) IV Continuous <Continuous>    MEDICATIONS  (PRN):  acetaminophen   Tablet .. 650 milliGRAM(s) Oral every 6 hours PRN Mild Pain (1 - 3)          Patient is a 29y old  Male who presented with PFO.  S/P percutaneous PFO closure.  Hemodynamically stable.  Good oxygenation.  Fair urine out put.  Overall doing well.      My plan includes :  Statin and Betablocker.  Dual antiplatelet Rx.  Close hemodynamic, ventilatory and drain monitoring and management  Monitor for arrhythmias and monitor parameters for organ perfusion  Monitor neurologic status  Monitor renal function.  Head of the bed should remain elevated to 45 deg .   Chest PT and IS will be encouraged  Conitor adequacy of oxygenation and ventilation and attempt to wean oxygen  Nutritional goals will be met using po eventually , ensure adequate caloric intake and monitor the same  Stress ulcer and VTE prophylaxis will be achieved    Glycemic control is satisfactory  Electrolytes have been repleted as necessary and wound care has been carried out. Pain control has been achieved.   Aggressive physical therapy and early mobility and ambulation goals will be met   The family was updated about the course and plan  CRITICAL CARE TIME SPENT in evaluation and management, reassessments, review and interpretation of labs and x-rays, ventilator and hemodynamic management, formulating a plan and coordinating care: ___60____ MIN.  Time does not include procedural time.  CTICU ATTENDING     					    Jamarcus Baker MD

## 2020-02-06 NOTE — H&P ADULT - HISTORY OF PRESENT ILLNESS
30 y/o male who works in [employer] with hx HLD, TIA in 8/2019, and PFO.  Pt feels well.  No episodes unilateral weakness, syncope, visual changes, slurred speech.  Pt seen by Dr. Stewart on 12/18/19 and MRI brain negative.  Pt seen by Dr. Sandoval on 1/7/20 and no significant findings.  Pt presents today for percutaneous PFO closure.  No changes in current medical condition.  No recent fevers, chills, illnesses.  Pt has been NPO since midnight.  Procedure, risks, expectations, recovery discussed with patient and consent obtained. 30 y/o male who works in [employer] with hx HLD, ASD in 8/2019, and PFO.  Pt feels well.  No episodes unilateral weakness, syncope, visual changes, slurred speech.  Pt seen by Dr. Stewart on 12/18/19 and MRI brain negative.  Pt seen by Dr. Sandoval on 1/7/20 and no significant findings.  Pt presents today for percutaneous PFO closure.  No changes in current medical condition.  No recent fevers, chills, illnesses.  Pt has been NPO since midnight.  Procedure, risks, expectations, recovery discussed with patient and consent obtained.

## 2020-02-07 ENCOUNTER — TRANSCRIPTION ENCOUNTER (OUTPATIENT)
Age: 30
End: 2020-02-07

## 2020-02-07 VITALS
SYSTOLIC BLOOD PRESSURE: 137 MMHG | HEART RATE: 98 BPM | DIASTOLIC BLOOD PRESSURE: 62 MMHG | RESPIRATION RATE: 22 BRPM | OXYGEN SATURATION: 97 %

## 2020-02-07 LAB
ALBUMIN SERPL ELPH-MCNC: 4.4 G/DL — SIGNIFICANT CHANGE UP (ref 3.3–5)
ALP SERPL-CCNC: 80 U/L — SIGNIFICANT CHANGE UP (ref 40–120)
ALT FLD-CCNC: 18 U/L — SIGNIFICANT CHANGE UP (ref 10–45)
ANION GAP SERPL CALC-SCNC: 14 MMOL/L — SIGNIFICANT CHANGE UP (ref 5–17)
APTT BLD: 32.7 SEC — SIGNIFICANT CHANGE UP (ref 27.5–36.3)
AST SERPL-CCNC: 21 U/L — SIGNIFICANT CHANGE UP (ref 10–40)
BILIRUB SERPL-MCNC: 1.8 MG/DL — HIGH (ref 0.2–1.2)
BUN SERPL-MCNC: 13 MG/DL — SIGNIFICANT CHANGE UP (ref 7–23)
CALCIUM SERPL-MCNC: 9.3 MG/DL — SIGNIFICANT CHANGE UP (ref 8.4–10.5)
CHLORIDE SERPL-SCNC: 102 MMOL/L — SIGNIFICANT CHANGE UP (ref 96–108)
CO2 SERPL-SCNC: 21 MMOL/L — LOW (ref 22–31)
CREAT SERPL-MCNC: 0.91 MG/DL — SIGNIFICANT CHANGE UP (ref 0.5–1.3)
GLUCOSE BLDC GLUCOMTR-MCNC: 94 MG/DL — SIGNIFICANT CHANGE UP (ref 70–99)
GLUCOSE SERPL-MCNC: 137 MG/DL — HIGH (ref 70–99)
HCT VFR BLD CALC: 44.9 % — SIGNIFICANT CHANGE UP (ref 39–50)
HGB BLD-MCNC: 14.9 G/DL — SIGNIFICANT CHANGE UP (ref 13–17)
INR BLD: 1.2 — HIGH (ref 0.88–1.16)
MAGNESIUM SERPL-MCNC: 2.3 MG/DL — SIGNIFICANT CHANGE UP (ref 1.6–2.6)
MCHC RBC-ENTMCNC: 28.7 PG — SIGNIFICANT CHANGE UP (ref 27–34)
MCHC RBC-ENTMCNC: 33.2 GM/DL — SIGNIFICANT CHANGE UP (ref 32–36)
MCV RBC AUTO: 86.5 FL — SIGNIFICANT CHANGE UP (ref 80–100)
NRBC # BLD: 0 /100 WBCS — SIGNIFICANT CHANGE UP (ref 0–0)
PHOSPHATE SERPL-MCNC: 4.5 MG/DL — SIGNIFICANT CHANGE UP (ref 2.5–4.5)
PLATELET # BLD AUTO: 270 K/UL — SIGNIFICANT CHANGE UP (ref 150–400)
POTASSIUM SERPL-MCNC: 4.5 MMOL/L — SIGNIFICANT CHANGE UP (ref 3.5–5.3)
POTASSIUM SERPL-SCNC: 4.5 MMOL/L — SIGNIFICANT CHANGE UP (ref 3.5–5.3)
PROT SERPL-MCNC: 7.2 G/DL — SIGNIFICANT CHANGE UP (ref 6–8.3)
PROTHROM AB SERPL-ACNC: 13.7 SEC — HIGH (ref 10–12.9)
RBC # BLD: 5.19 M/UL — SIGNIFICANT CHANGE UP (ref 4.2–5.8)
RBC # FLD: 12.8 % — SIGNIFICANT CHANGE UP (ref 10.3–14.5)
SODIUM SERPL-SCNC: 137 MMOL/L — SIGNIFICANT CHANGE UP (ref 135–145)
WBC # BLD: 15.23 K/UL — HIGH (ref 3.8–10.5)
WBC # FLD AUTO: 15.23 K/UL — HIGH (ref 3.8–10.5)

## 2020-02-07 PROCEDURE — 93306 TTE W/DOPPLER COMPLETE: CPT | Mod: 26

## 2020-02-07 PROCEDURE — 71045 X-RAY EXAM CHEST 1 VIEW: CPT | Mod: 26

## 2020-02-07 PROCEDURE — 93010 ELECTROCARDIOGRAM REPORT: CPT

## 2020-02-07 RX ORDER — PANTOPRAZOLE SODIUM 20 MG/1
1 TABLET, DELAYED RELEASE ORAL
Qty: 30 | Refills: 0
Start: 2020-02-07 | End: 2020-03-07

## 2020-02-07 RX ORDER — ATORVASTATIN CALCIUM 80 MG/1
20 TABLET, FILM COATED ORAL AT BEDTIME
Refills: 0 | Status: DISCONTINUED | OUTPATIENT
Start: 2020-02-07 | End: 2020-02-07

## 2020-02-07 RX ORDER — ACETAMINOPHEN 500 MG
2 TABLET ORAL
Qty: 40 | Refills: 0
Start: 2020-02-07 | End: 2020-02-11

## 2020-02-07 RX ORDER — ATORVASTATIN CALCIUM 80 MG/1
1 TABLET, FILM COATED ORAL
Qty: 30 | Refills: 0
Start: 2020-02-07 | End: 2020-03-07

## 2020-02-07 RX ORDER — ALBUMIN HUMAN 25 %
250 VIAL (ML) INTRAVENOUS
Refills: 0 | Status: DISCONTINUED | OUTPATIENT
Start: 2020-02-07 | End: 2020-02-07

## 2020-02-07 RX ORDER — CLOPIDOGREL BISULFATE 75 MG/1
1 TABLET, FILM COATED ORAL
Qty: 30 | Refills: 0
Start: 2020-02-07 | End: 2020-03-07

## 2020-02-07 RX ORDER — ASPIRIN/CALCIUM CARB/MAGNESIUM 324 MG
1 TABLET ORAL
Qty: 30 | Refills: 0
Start: 2020-02-07 | End: 2020-03-07

## 2020-02-07 RX ORDER — POLYETHYLENE GLYCOL 3350 17 G/17G
17 POWDER, FOR SOLUTION ORAL DAILY
Refills: 0 | Status: DISCONTINUED | OUTPATIENT
Start: 2020-02-07 | End: 2020-02-07

## 2020-02-07 RX ADMIN — HEPARIN SODIUM 5000 UNIT(S): 5000 INJECTION INTRAVENOUS; SUBCUTANEOUS at 13:08

## 2020-02-07 RX ADMIN — CLOPIDOGREL BISULFATE 75 MILLIGRAM(S): 75 TABLET, FILM COATED ORAL at 11:20

## 2020-02-07 RX ADMIN — HEPARIN SODIUM 5000 UNIT(S): 5000 INJECTION INTRAVENOUS; SUBCUTANEOUS at 05:11

## 2020-02-07 RX ADMIN — Medication 100 MILLIGRAM(S): at 05:09

## 2020-02-07 RX ADMIN — PANTOPRAZOLE SODIUM 40 MILLIGRAM(S): 20 TABLET, DELAYED RELEASE ORAL at 11:23

## 2020-02-07 RX ADMIN — Medication 100 MILLIGRAM(S): at 13:07

## 2020-02-07 RX ADMIN — CLOPIDOGREL BISULFATE 300 MILLIGRAM(S): 75 TABLET, FILM COATED ORAL at 03:45

## 2020-02-07 RX ADMIN — Medication 81 MILLIGRAM(S): at 11:20

## 2020-02-07 NOTE — DISCHARGE NOTE PROVIDER - NSDCFUSCHEDAPPT_GEN_ALL_CORE_FT
[facility]G [facility]ULETA ; 04/10/2020 ; N[facility] Cardio [address] [facility]ULETA ; 04/10/2020 ; NPP Cardio [address] MEG ZULETA ; 02/19/2020 ; NPP CT Surg [address]  MEG ZULETA ; 02/20/2020 ; [facility] HemOnc [address] [facility] ; 04/10/2020 ; NPP Cardio [address] MEG ZULETA ; 02/19/2020 ; NPP CT Surg [address]  MEG ZULETA ; 02/20/2020 ; [facility] HemOnc [address] [facility] ; 04/10/2020 ; NPP Cardio [address] [facility]G ZULETA ; 02/19/2020 ; NPP CT Surg [address]  MEG ZULETA ; 02/20/2020 ; [facility] HemOnc [address] [facility] ; 04/10/2020 ; NPP Cardio [address]

## 2020-02-07 NOTE — PATIENT PROFILE ADULT - DO YOU FEEL LIKE HURTING OTHERS?
"Chief complaint: annual    Subjective   History of Present Illness    Neva Whitaker is a 23 y.o.  who presents for annual exam. C/o recurrent bacterial vaginosis. She takes flagyl and it gets better, but it comes right back. Takes zoloft 50 mg for anxiety and notes some decreased libido. Happy w/ Mirena IUD (placed 2017) and has amenorrhea with rare spotting.  Her menses are absent.  Soc Hx- in college at [employer], psych major, also works as a physical therapy tech. 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 Patient has had an implant.         Current contraception: IUD  History of abnormal Pap smear: no    Exercise: very active  Calcium/Vitamin D: adequate intake    The following portions of the patient's history were reviewed and updated as appropriate: allergies, current medications, past family history, past medical history, past social history, past surgical history and problem list.    Review of Systems   Constitutional: Negative.    Respiratory: Negative.    Cardiovascular: Negative.    Gastrointestinal: Negative.    Genitourinary: Positive for vaginal discharge.   Neurological: Negative.    Psychiatric/Behavioral: The patient is nervous/anxious.        Pertinent items are noted in HPI.     Objective   Physical Exam    /62   Ht 177.8 cm (70\")   Wt 80.9 kg (178 lb 6.4 oz)   Breastfeeding No   BMI 25.60 kg/m²     General:   alert, appears stated age and cooperative   Neck: no asymmetry, masses, or scars   Heart: regular rate and rhythm, S1, S2 normal, no murmur, click, rub or gallop   Lungs: clear to auscultation bilaterally   Abdomen: soft, non-tender, without masses or organomegaly   Breast: inspection " negative, no nipple discharge or bleeding, no masses or nodularity palpable   Vulva: Bartholin's, Urethra, [place]'s normal   Vagina: normal mucosa, scant blood in vault   Cervix: no cervical motion tenderness, no lesions, nulliparous appearance and IUD string noted   Uterus: normal size, anteverted, mobile, non-tender, normal shape and consistency   Adnexa: normal adnexa and no mass, fullness, tenderness   Rectal: not indicated     Assessment/Plan   Neva was seen today for gynecologic exam.    Diagnoses and all orders for this visit:    Well woman exam with routine gynecological exam  -     Pap IG, Ct-Ng, Rfx HPV ASCU    Screen for STD (sexually transmitted disease)  -     Pap IG, Ct-Ng, Rfx HPV ASCU    BV (bacterial vaginosis)  -     CLINDESSE 2 % vaginal cream; 1 applicatorful in vagina once  -     tinidazole (TINDAMAX) 500 MG tablet; 2 tab po daily for 5 days    can also try boric acid supp for recurrent BV. rx 600 mg per vagina QHS for 14 days    Breast self exam technique reviewed and patient encouraged to perform self-exam monthly.  Discussed healthy lifestyle modifications.  Pap smear sent    Discussed decreased libido, could be a side effect of the zoloft, can talk with primary MD and try a different type of medication (wellbutrin) without sexual side effects              no

## 2020-02-07 NOTE — PROGRESS NOTE ADULT - ASSESSMENT
-Please follow up with Dr. Griffith on 2/19/20 at 10:45AM.  The office is located at [facility], [facility], 4th floor. Call us with any questions #[phone].  -Walk daily as tolerated and use your incentive spirometer every hour.  -No driving or strenuous activity/exercise until cleared by your surgeon.  -Gently clean your incisions with anti-bacterial soap and water, pat dry.  You may leave them open to air.  -Call your doctor if you have shortness of breath, chest pain not relieved by pain medication, dizziness, fever >101.5, or increased redness or drainage from incisions.

## 2020-02-07 NOTE — DISCHARGE NOTE PROVIDER - NSDCMRMEDTOKEN_GEN_ALL_CORE_FT
aspirin 81 mg oral delayed release tablet: 1 tab(s) orally once a day  Lipitor 20 mg oral tablet: 1 tab(s) orally once a day acetaminophen 325 mg oral tablet: 2 tab(s) orally every 6 hours, As needed, Mild Pain (1 - 3) MDD:Do not exceed 4000mg per day  aspirin 81 mg oral delayed release tablet: 1 tab(s) orally once a day  clopidogrel 75 mg oral tablet: 1 tab(s) orally once a day  Lipitor 20 mg oral tablet: 1 tab(s) orally once a day   pantoprazole 40 mg oral delayed release tablet: 1 tab(s) orally once

## 2020-02-07 NOTE — DISCHARGE NOTE PROVIDER - NSDCCPTREATMENT_GEN_ALL_CORE_FT
PRINCIPAL PROCEDURE  Procedure: Repair of patent foramen ovale (PFO) in adult  Findings and Treatment: ASD closure

## 2020-02-07 NOTE — DISCHARGE NOTE PROVIDER - PROVIDER TOKENS
PROVIDER:[TOKEN:[[id number]]],PROVIDER:[TOKEN:[[id number]]] PROVIDER:[TOKEN:[[id number]]],PROVIDER:[TOKEN:[[id number]]],PROVIDER:[TOKEN:[[id number]]]

## 2020-02-07 NOTE — DISCHARGE NOTE NURSING/CASE MANAGEMENT/SOCIAL WORK - PATIENT PORTAL LINK FT
You can access the FollowMyHealth Patient Portal offered by [facility] by registering at the following website: [email or web address]. By joining [facility]’s FollowMyHealth portal, you will also be able to view your health information using other applications (apps) compatible with our system.

## 2020-02-07 NOTE — DISCHARGE NOTE PROVIDER - NSDCFUADDAPPT_GEN_ALL_CORE_FT
-Please follow up with Dr. Griffith on ___ .  The office is located at [facility], [facility], 4th floor. Call us with any questions #[phone].  -  Please also follow up with referring MD, Dr. Quintana, on ____. -Please follow up with Dr. Griffith on 2/19/20 at 10:45AM.  The office is located at [facility], [facility], 4th floor. Call us with any questions #[phone].  -  Please also follow up with referring MD, Dr. Quintana, on ____.    o	Dr. Sandoval (referring): 1-2 weeks ---Booked 02/20 at 4:15pm -Please follow up with Dr. Griffith on 2/19/20 at 10:45AM.  The office is located at [facility], [facility], 4th floor. Call us with any questions #[phone].

## 2020-02-07 NOTE — DISCHARGE NOTE PROVIDER - CARE PROVIDERS DIRECT ADDRESSES
,[email or web address]

## 2020-02-07 NOTE — DISCHARGE NOTE PROVIDER - NSDCFUADDINST_GEN_ALL_CORE_FT
-Walk daily as tolerated and use your incentive spirometer every hour.    -No driving or strenuous activity/exercise until cleared by your surgeon.    -Gently clean your incisions with anti-bacterial soap and water, pat dry.  You may leave them open to air.    -Call your doctor if you have shortness of breath, chest pain not relieved by pain medication, dizziness, fever >101.5, or increased redness or drainage from incisions. -Walk daily as tolerated and use your incentive spirometer every hour.  -No driving or strenuous activity/exercise until cleared by your surgeon.  -Gently clean your incisions with anti-bacterial soap and water, pat dry.  You may leave them open to air.  -Call your doctor if you have shortness of breath, chest pain not relieved by pain medication, dizziness, fever >101.5, or increased redness or drainage from incisions.

## 2020-02-07 NOTE — DISCHARGE NOTE PROVIDER - CARE PROVIDER_API CALL
Jason Griffith)  Cardiology; Interventional Cardiology  [address], [address]th [place] [address]  Phone: [phone]  Fax: [phone]  Follow Up Time:     Duong Quintana)  Cardiovascular Disease; Internal Medicine; Interventional Cardiology; Nuclear Cardiology  [address] [place] [address], [address]  [place] [address]  Phone: [phone]  Fax: [phone]  Follow Up Time: Jason Griffith)  Cardiology; Interventional Cardiology  [address], [address]th [place] [address]  Phone: [phone]  Fax: [phone]  Follow Up Time:     Duong Quintana)  Cardiovascular Disease; Internal Medicine; Interventional Cardiology; Nuclear Cardiology  [address] [place] [address], [address]  [place] [address]  Phone: [phone]  Fax: [phone]  Follow Up Time:     Romain Sandoval)  Internal Medicine; Medical Oncology  [address] [place] [address]  Phone: [phone]  Fax: [phone]  Follow Up Time:

## 2020-02-07 NOTE — DISCHARGE NOTE PROVIDER - HOSPITAL COURSE
30 y/o male who works in [employer] with PMHx of HLD, TIA in 8/2019 (no deficits), and upon workup on RON, found to have secundum ASD with shunt. Patient evaluated by Structural Heart Disease, Dr. Griffith, and deemed a good candidate for ASD closure. On 2/6/20, patient presented to [facility] and underwent percutaneous ASD closure, intraop uncomplicated, and patient transferred to mini-ICU in stable condition.  POD1 TTE performed revealing ____ .         35 minutes was spent with the patient reviewing the discharge material including medications, follow up appointments, recovery, concerning symptoms, and how to contact their health care providers if they have questions 30 y/o male who works in [employer] with PMHx of HLD, TIA in 8/2019 (no deficits), and upon workup on RON, found to have secundum ASD with shunt. Patient evaluated by Structural Heart Disease, Dr. Griffith, and deemed a good candidate for ASD closure. On 2/6/20, patient presented to [facility] and underwent percutaneous ASD closure, intraop uncomplicated, and patient transferred to mini-ICU in stable condition.  POD1 TTE performed revealing ____ . Per Dr. Griffith patient deemed stable for discharge home today, on Aspirin and Plavix.        35 minutes was spent with the patient reviewing the discharge material including medications, follow up appointments, recovery, concerning symptoms, and how to contact their health care providers if they have questions 28 y/o male who works in [employer] with PMHx of HLD, TIA in 8/2019 (no deficits), and upon workup on RON, found to have secundum ASD with shunt. Patient evaluated by Structural Heart Disease, Dr. Griffith, and deemed a good candidate for ASD closure. On 2/6/20, patient presented to [facility] and underwent percutaneous ASD closure, intraop uncomplicated, and patient transferred to mini-ICU in stable condition.  POD1 TTE performed as per protocol and images reviewed by Dr. Griffith. Per Dr. Griffith patient deemed stable for discharge home today, on Aspirin and Plavix.        35 minutes was spent with the patient reviewing the discharge material including medications, follow up appointments, recovery, concerning symptoms, and how to contact their health care providers if they have questions

## 2020-02-07 NOTE — DISCHARGE NOTE PROVIDER - REASON FOR ADMISSION
Progress Notes by Sridevi Perez MD at 17 09:57 AM     Author:  Sridevi Perez MD Service:  (none) Author Type:  Physician     Filed:  17 05:42 PM Encounter Date:  2017 Status:  Signed     :  Sridevi Perez MD (Physician)                4 Month Well Child Visit   Roomed by: Claribel Azevedo RN 9:56 AM     Accompanied by:[CD1.1T] Mother and Father[CD1.1M]  [phone] (home)  [phone] (cell)[CD1.1T] Okay to leave detailed msg[CD1.1M]    DEVELOPMENTAL LANDMARKS  Grasps rattle, brings to mouth?[CD1.1T] Yes[CD1.1M]  Head erect & steady when held upright?[CD1.1T] Yes[CD1.1M]  Rolls over?[CD1.1T] Yes[CD1.1M]  Cooing & laughing?[CD1.1T] Yes[CD1.1M]  Clasps hands together?[CD1.1T] Yes[CD1.1M]  Follows objects 180 degrees?[CD1.1T] Yes[CD1.1M]  Total number of \"No\" responses to developmental landmark questions:[CD1.1T] 0[CD1.1M]    SUBJECTIVE:   Present health:[CD1.1T] Concerns: none.  Doing well.[NK1.1M]    Diet:[CD1.1T] similac sensitive/gas[NK1.1M].  Started solids:[CD1.1T] no[NK1.1M]  Elimination:      BM’s:[CD1.1T] Normal[NK1.1M]  Urine:[CD1.1T] Normal[NK1.1M]  Sleep:[CD1.1T] Normal[NK1.1M]   Sleeps on :[CD1.1T] Back[NK1.1M]    [place]  Depression Scale?[CD1.1T] Previously completed.[NK1.1M]    ROS  All other systems reviewed were negative    Allergies:  Review of patient's allergies indicates no known allergies.     No current outpatient prescriptions on file.       Family history:[CD1.1T] grandfather has cancer[NK1.1M]    Social history:[CD1.1T] either is home with mother, father or grandparents[NK1.1M]     OBJECTIVE:   Physical exam   Pulse 140  Temp 97.8 °F (36.6 °C) (Axillary)  Resp 40  Ht 2' 1\" (0.635 m)  Wt 16 lb 6 oz (7.428 kg)  HC 17.75\" (45.1 cm)  BMI 18.42 kg/m2     GENERAL: Evaluation of appearance.  Findings:[CD1.1T] Normal- alert and no distress noted[NK1.2T]    HEAD & SCALP: Evaluation for lesions, swelling, tenderness and  abnormalities.  Findings:[CD1.1T] Normal - normocephalic with no lesions present[NK1.2T]     [place]:[CD1.1T] Anterior [place] - open[NK1.2M]    EYES: Evaluation for redness, swelling, drainage and abnormalities.  Findings:[CD1.1T] Both eyes normal - red reflex present, no redness or drainage, YRN[NK1.2T]    EARS: Evaluation of  external ears, canals, and tm's  Findings:[CD1.1T] Normal bilateral ext. ears, canals, and tm's[NK1.2T]    NOSE: Evaluation for swelling and drainage  Findings:[CD1.1T] Normal - patent with no swelling or discharge present[NK1.2T]    MOUTH: Evaluation of tongue and mucosal membranes  Findings:[CD1.1T] Normal[NK1.2T]    THROAT: Evaluation for redness and lesions  Findings:[CD1.1T] Normal[NK1.2T]    NECK: Evaluation for masses, swelling and soreness  Findings:[CD1.1T] Supple, no adenopathy or masses[NK1.2T]    CHEST: Evaluation - auscultation and observation  Findings:[CD1.1T] Normal - clear to auscultation, breath sounds normal, no rhonchi - wheezes - or rales[NK1.2T]. BREAST:[CD1.1T] Normal[NK1.2T]    CARDIO: Evaluation by auscultation   Findings:[CD1.1T] Normal - RRR, normal S1&S2, no murmurs or extra sounds noted[NK1.2T]    ABDOMEN: Evaluation for bowel sounds, organomegaly, masses and tenderness  Findings:[CD1.1T] Normal - soft, no tenderness, no masses, no organomegaly[NK1.2T]    : Evaluation by inspections.  Findings:[CD1.1T] normal male - testes descended bilaterally[NK1.2T]    MUS-SKEL: Evaluation for swelling, edema, range of motion or other abnormalities.  Findings:[CD1.1T] Normal, no scoliosis or other abnormalities[NK1.2T].  Hip Exam:[CD1.1T] Normal[NK1.2T]    SKIN: Evaluation for rashes, acne and lesions  Findings:[CD1.1T] Normal[NK1.2T]    NEURO: Evaluation by observation.  Findings:[CD1.1T] Normal[NK1.2T]      ASSESSMENT:[CD1.1T]   Normal Health Maintenance Visit[NK1.2T]    PLAN:  Medication changes:[CD1.1T] No[NK1.2M]  Immunizations given today?[CD1.1T] Yes.  Vaccine  PFO counseling including benefits, risks and adverse reactions were provided by myself during the visit.[NK1.2M]  See Orders  Reviewed Discussion and Guidance Topics:[CD1.1T] accident prevention: falls, car seat, safe toys, no walkers, sleeping and scheduling, stimulation: pictures, talk to infant, read to infant and range of normal bowel habits[NK1.2M]    Parent instructed to use baby book.    Make Appointment for 6 Month Well Child Checkup  Call if questions or problems.[CD1.1T]    Electronically Signed by:    Sridevi Perez MD , 2/14/2017[NK1.3T]        Revision History        User Key Date/Time User Provider Type Action    > NK1.3 02/14/17 05:42 PM Sridevi Perez MD Physician Sign     NK1.2 02/14/17 05:39 PM Sridevi Perez MD Physician      NK1.1 02/14/17 10:20 AM Sridevi Perez MD Physician      CD1.1 02/14/17 09:57 AM Claribel Azevedo RN Registered Nurse Sign at close encounter    M - Manual, T - Template

## 2020-02-07 NOTE — PROGRESS NOTE ADULT - SUBJECTIVE AND OBJECTIVE BOX
Patient discussed on morning rounds with Dr. Griffith    Operation / Date: 2/6/2020 percutaneous ASD closure    Surgeon: Dr. Griffith    Referring Physician: Dr. Quintana    SUBJECTIVE ASSESSMENT:  29y Male seen and examined bedside.      AND HOSPITAL COURSE:    Vital Signs Last 24 Hrs  T(C): 37.3 (07 Feb 2020 05:01), Max: 37.3 (07 Feb 2020 01:01)  T(F): 99.2 (07 Feb 2020 05:01), Max: 99.2 (07 Feb 2020 05:01)  HR: 76 (07 Feb 2020 08:00) (68 - 95)  BP: 114/70 (07 Feb 2020 08:00) (109/69 - 142/70)  BP(mean): 88 (07 Feb 2020 08:00) (79 - 91)  RR: 17 (07 Feb 2020 08:00) (14 - 18)  SpO2: 97% (07 Feb 2020 08:00) (96% - 100%)    EPICARDIAL WIRES REMOVED: Yes/No.  TIE DOWNS REMOVED: Yes/No.    PHYSICAL EXAM:    General:     Neurological:    Cardiovascular:    Respiratory:    Gastrointestinal:    Extremities:    Vascular:    Incision Sites:    LABS:                        14.9   15.23 )-----------( 270      ( 07 Feb 2020 01:51 )             44.9       COUMADIN: No.      PT/INR - ( 07 Feb 2020 01:51 )   PT: 13.7 sec;   INR: 1.20          PTT - ( 07 Feb 2020 01:51 )  PTT:32.7 sec    02-07    137  |  102  |  13  ----------------------------<  137<H>  4.5   |  21<L>  |  0.91    Ca    9.3      07 Feb 2020 01:51  Phos  4.5     02-07  Mg     2.3     02-07    TPro  7.2  /  Alb  4.4  /  TBili  1.8<H>  /  DBili  x   /  AST  21  /  ALT  18  /  AlkPhos  80  02-07          Discharge CXR:    Discharge ECHO: Patient discussed on morning rounds with Dr. Griffith    Operation / Date: 2/6/2020 percutaneous ASD closure    Surgeon: Dr. Griffith    Referring Physician: Dr. Quintana    SUBJECTIVE ASSESSMENT:  29y Male seen and examined bedside. Patient is not offering any acute complaints and states he feels well. Denies chest pain, SOB, palpitations, hemopytsis, N/V/D, abdominal pain, dizziness, fever or chills. Patient is ambulating, tolerating PO diet, and voiding spontaneously. He is using IS pulling 2500cc.     AND HOSPITAL COURSE:  28 y/o male who works in [employer] with PMHx of HLD, TIA in 8/2019 (no deficits), and upon workup on RON, found to have secundum ASD with shunt. Patient evaluated by Structural Heart Disease, Dr. Griffith, and deemed a good candidate for ASD closure. On 2/6/20, patient presented to [facility] and underwent percutaneous ASD closure, intraop uncomplicated, and patient transferred to mini-ICU in stable condition.  POD1 TTE performed as per protocol and images reviewed by Dr. Griffith. Per Dr. Griffith patient deemed stable for discharge home today, on Aspirin and Plavix.    35 minutes was spent with the patient reviewing the discharge material including medications, follow up appointments, recovery, concerning symptoms, and how to contact their health care providers if they have questions        Vital Signs Last 24 Hrs  T(C): 37.3 (07 Feb 2020 05:01), Max: 37.3 (07 Feb 2020 01:01)  T(F): 99.2 (07 Feb 2020 05:01), Max: 99.2 (07 Feb 2020 05:01)  HR: 76 (07 Feb 2020 08:00) (68 - 95)  BP: 114/70 (07 Feb 2020 08:00) (109/69 - 142/70)  BP(mean): 88 (07 Feb 2020 08:00) (79 - 91)  RR: 17 (07 Feb 2020 08:00) (14 - 18)  SpO2: 97% (07 Feb 2020 08:00) (96% - 100%)    EPICARDIAL WIRES REMOVED: Yes  TIE DOWNS REMOVED: Yes    PHYSICAL EXAM:    General: Patient lying comfortably in bed, no acute distress     Neurological: Alert and oriented. No focal neurological deficits     Cardiovascular: S1S2, RRR, no murmurs appreciated on exam     Respiratory: Clear to ausculation bilaterally, no w/r/r    Gastrointestinal: Abdomen soft, non tender, non distended, +BS    Extremities: Warm and well perfused. No peripheral edema or calf tenderness b/l.    Vascular: Peripheral pulses palpable bilaterally.     Incision Sites: Groin sites c/d/i, soft, no hematoma.       LABS:                        14.9   15.23 )-----------( 270      ( 07 Feb 2020 01:51 )             44.9       COUMADIN: No.      PT/INR - ( 07 Feb 2020 01:51 )   PT: 13.7 sec;   INR: 1.20          PTT - ( 07 Feb 2020 01:51 )  PTT:32.7 sec    02-07    137  |  102  |  13  ----------------------------<  137<H>  4.5   |  21<L>  |  0.91    Ca    9.3      07 Feb 2020 01:51  Phos  4.5     02-07  Mg     2.3     02-07    TPro  7.2  /  Alb  4.4  /  TBili  1.8<H>  /  DBili  x   /  AST  21  /  ALT  18  /  AlkPhos  80  02-07          Discharge CXR:  < from: Xray Chest 1 View- PORTABLE-Routine (02.07.20 @ 05:14) >    EXAM:  XR CHEST PORTABLE ROUTINE 1V                          PROCEDURE DATE:  02/07/2020          INTERPRETATION:  Clinical history/reason for exam: PFO closure.    Single frontal view.    Comparison: February 6, 2020.    Findings/  impression: Stable right postsurgical changes. No acute focal opacity. Heart, mediastinum and thorax are unremarkable.  .      < end of copied text >      Discharge ECHO:  2/7/20 echo official report pending.

## 2020-02-08 ENCOUNTER — EMERGENCY (EMERGENCY)
Facility: HOSPITAL | Age: 30
LOS: 0 days | Discharge: HOME | End: 2020-02-08
Attending: EMERGENCY MEDICINE | Admitting: EMERGENCY MEDICINE
Payer: OTHER GOVERNMENT

## 2020-02-08 VITALS
HEART RATE: 90 BPM | HEIGHT: 71 IN | SYSTOLIC BLOOD PRESSURE: 140 MMHG | DIASTOLIC BLOOD PRESSURE: 92 MMHG | RESPIRATION RATE: 18 BRPM | TEMPERATURE: 98 F | OXYGEN SATURATION: 99 %

## 2020-02-08 DIAGNOSIS — Z00.00 ENCOUNTER FOR GENERAL ADULT MEDICAL EXAMINATION WITHOUT ABNORMAL FINDINGS: ICD-10-CM

## 2020-02-08 DIAGNOSIS — T81.40XA INFECTION FOLLOWING A PROCEDURE, UNSPECIFIED, INITIAL ENCOUNTER: ICD-10-CM

## 2020-02-08 DIAGNOSIS — R05 COUGH: ICD-10-CM

## 2020-02-08 LAB
ALBUMIN SERPL ELPH-MCNC: 4.8 G/DL — SIGNIFICANT CHANGE UP (ref 3.5–5.2)
ALP SERPL-CCNC: 70 U/L — SIGNIFICANT CHANGE UP (ref 30–115)
ALT FLD-CCNC: 18 U/L — SIGNIFICANT CHANGE UP (ref 0–41)
ANION GAP SERPL CALC-SCNC: 13 MMOL/L — SIGNIFICANT CHANGE UP (ref 7–14)
APPEARANCE UR: CLEAR — SIGNIFICANT CHANGE UP
APTT BLD: 29.6 SEC — SIGNIFICANT CHANGE UP (ref 27–39.2)
AST SERPL-CCNC: 20 U/L — SIGNIFICANT CHANGE UP (ref 0–41)
BASOPHILS # BLD AUTO: 0.05 K/UL — SIGNIFICANT CHANGE UP (ref 0–0.2)
BASOPHILS NFR BLD AUTO: 0.6 % — SIGNIFICANT CHANGE UP (ref 0–1)
BILIRUB SERPL-MCNC: 1 MG/DL — SIGNIFICANT CHANGE UP (ref 0.2–1.2)
BILIRUB UR-MCNC: NEGATIVE — SIGNIFICANT CHANGE UP
BUN SERPL-MCNC: 10 MG/DL — SIGNIFICANT CHANGE UP (ref 10–20)
CALCIUM SERPL-MCNC: 9.4 MG/DL — SIGNIFICANT CHANGE UP (ref 8.5–10.1)
CHLORIDE SERPL-SCNC: 99 MMOL/L — SIGNIFICANT CHANGE UP (ref 98–110)
CO2 SERPL-SCNC: 26 MMOL/L — SIGNIFICANT CHANGE UP (ref 17–32)
COLOR SPEC: SIGNIFICANT CHANGE UP
CREAT SERPL-MCNC: 0.9 MG/DL — SIGNIFICANT CHANGE UP (ref 0.7–1.5)
DIFF PNL FLD: NEGATIVE — SIGNIFICANT CHANGE UP
EOSINOPHIL # BLD AUTO: 0.13 K/UL — SIGNIFICANT CHANGE UP (ref 0–0.7)
EOSINOPHIL NFR BLD AUTO: 1.5 % — SIGNIFICANT CHANGE UP (ref 0–8)
GLUCOSE SERPL-MCNC: 91 MG/DL — SIGNIFICANT CHANGE UP (ref 70–99)
GLUCOSE UR QL: NEGATIVE — SIGNIFICANT CHANGE UP
HCT VFR BLD CALC: 45.3 % — SIGNIFICANT CHANGE UP (ref 42–52)
HGB BLD-MCNC: 15 G/DL — SIGNIFICANT CHANGE UP (ref 14–18)
IMM GRANULOCYTES NFR BLD AUTO: 0.2 % — SIGNIFICANT CHANGE UP (ref 0.1–0.3)
INR BLD: 1.06 RATIO — SIGNIFICANT CHANGE UP (ref 0.65–1.3)
KETONES UR-MCNC: NEGATIVE — SIGNIFICANT CHANGE UP
LACTATE SERPL-SCNC: 0.8 MMOL/L — SIGNIFICANT CHANGE UP (ref 0.7–2)
LEUKOCYTE ESTERASE UR-ACNC: NEGATIVE — SIGNIFICANT CHANGE UP
LYMPHOCYTES # BLD AUTO: 1.75 K/UL — SIGNIFICANT CHANGE UP (ref 1.2–3.4)
LYMPHOCYTES # BLD AUTO: 20.7 % — SIGNIFICANT CHANGE UP (ref 20.5–51.1)
MCHC RBC-ENTMCNC: 29 PG — SIGNIFICANT CHANGE UP (ref 27–31)
MCHC RBC-ENTMCNC: 33.1 G/DL — SIGNIFICANT CHANGE UP (ref 32–37)
MCV RBC AUTO: 87.6 FL — SIGNIFICANT CHANGE UP (ref 80–94)
MONOCYTES # BLD AUTO: 0.91 K/UL — HIGH (ref 0.1–0.6)
MONOCYTES NFR BLD AUTO: 10.7 % — HIGH (ref 1.7–9.3)
NEUTROPHILS # BLD AUTO: 5.61 K/UL — SIGNIFICANT CHANGE UP (ref 1.4–6.5)
NEUTROPHILS NFR BLD AUTO: 66.3 % — SIGNIFICANT CHANGE UP (ref 42.2–75.2)
NITRITE UR-MCNC: NEGATIVE — SIGNIFICANT CHANGE UP
NRBC # BLD: 0 /100 WBCS — SIGNIFICANT CHANGE UP (ref 0–0)
PH UR: 6.5 — SIGNIFICANT CHANGE UP (ref 5–8)
PLATELET # BLD AUTO: 239 K/UL — SIGNIFICANT CHANGE UP (ref 130–400)
POTASSIUM SERPL-MCNC: 3.8 MMOL/L — SIGNIFICANT CHANGE UP (ref 3.5–5)
POTASSIUM SERPL-SCNC: 3.8 MMOL/L — SIGNIFICANT CHANGE UP (ref 3.5–5)
PROT SERPL-MCNC: 7.3 G/DL — SIGNIFICANT CHANGE UP (ref 6–8)
PROT UR-MCNC: NEGATIVE — SIGNIFICANT CHANGE UP
PROTHROM AB SERPL-ACNC: 12.2 SEC — SIGNIFICANT CHANGE UP (ref 9.95–12.87)
RBC # BLD: 5.17 M/UL — SIGNIFICANT CHANGE UP (ref 4.7–6.1)
RBC # FLD: 12.9 % — SIGNIFICANT CHANGE UP (ref 11.5–14.5)
SODIUM SERPL-SCNC: 138 MMOL/L — SIGNIFICANT CHANGE UP (ref 135–146)
SP GR SPEC: 1.01 — SIGNIFICANT CHANGE UP (ref 1.01–1.02)
UROBILINOGEN FLD QL: SIGNIFICANT CHANGE UP
WBC # BLD: 8.47 K/UL — SIGNIFICANT CHANGE UP (ref 4.8–10.8)
WBC # FLD AUTO: 8.47 K/UL — SIGNIFICANT CHANGE UP (ref 4.8–10.8)

## 2020-02-08 PROCEDURE — 99284 EMERGENCY DEPT VISIT MOD MDM: CPT

## 2020-02-08 PROCEDURE — 71046 X-RAY EXAM CHEST 2 VIEWS: CPT | Mod: 26

## 2020-02-08 RX ORDER — SACCHAROMYCES BOULARDII 250 MG
1 POWDER IN PACKET (EA) ORAL
Qty: 20 | Refills: 0
Start: 2020-02-08 | End: 2020-02-17

## 2020-02-08 RX ADMIN — Medication 1 TABLET(S): at 19:59

## 2020-02-08 NOTE — ED PROVIDER NOTE - OBJECTIVE STATEMENT
28 yo male, psh of PFO s/p repair, presents to ed for cough. pt sent in by cards since repair is recent. cough is mild, present for a few days, non productive. denies fever, chills, sob, cp.

## 2020-02-08 NOTE — CHART NOTE - NSCHARTNOTEFT_GEN_A_CORE
Telephone call    Pt's wife called concerned that the patient woke up this morning with a dry cough, bluish/greyish discoloration of his eyes, and at temperature of 101 max. He denied any other symptoms such as shortness of breath, chest pain, dizziness, diaphoresis, palpitations, nausea, or vomiting. He states that other than dry cough he feels fine. He did state that since being home yesterday he's mainly been in bed and not using his incentive spirometer. The patient was educated on the importance of ambulation and using his incentive spirometer. He was instructed to take Tylenol, use his IS, and ambulate at least 3-4 times per day. If temperature did no improve, or if he developed any new symptoms he was instructed to call back. Dr. Griffith was made aware.

## 2020-02-08 NOTE — ED PROVIDER NOTE - PATIENT PORTAL LINK FT
You can access the FollowMyHealth Patient Portal offered by [facility] by registering at the following website: [email or web address]. By joining [employer]’s FollowMyHealth portal, you will also be able to view your health information using other applications (apps) compatible with our system.

## 2020-02-08 NOTE — ED PROVIDER NOTE - ATTENDING CONTRIBUTION TO CARE
I personally evaluated the patient. I reviewed the Resident’s or Physician Assistant’s note (as assigned above), and agree with the findings and plan except as documented in my note.  28 yo man with recent ASD Repair presents to ED due to post-op fever.  Admits to mild cough and congestion.  Patient was sent in by Dr. Paul for eval of the fever as he wants to ensure no other issues.  Patient is very well appearing.  Has no immunocompromised state.  Vital signs stable.  I discussed with Dr. Paul and the plan is pan culture and start on amox/clav as long as labs look ok and no need for admission.  This was confirmed and patient was discharged in stable condition with amox/clav and florastor.  Informed to continue APAP to control fever and this may last 4-5 days minimum, but return for any new or worsening symptoms.  Dr. Paul states that he will follow up all cultures.

## 2020-02-08 NOTE — ED PROVIDER NOTE - CLINICAL SUMMARY MEDICAL DECISION MAKING FREE TEXT BOX
28 yo man s/p recent ASD closure presented for fever and cough.  Very well appearing.  Pan culture done at request of surgery due to concern for post-op infection.  However, likely viral URI.  Requested to start amox/clav prophylactically.  Stable for discharge.

## 2020-02-08 NOTE — ED PROVIDER NOTE - PROGRESS NOTE DETAILS
Results d/w patient and family and copies of results provided.  Pt instructed to return if any worsening symptoms or concerns.  They verbalize understanding. sxs that should prompt return explained to pt, verbalizes understanding.

## 2020-02-08 NOTE — ED ADULT NURSE REASSESSMENT NOTE - NS ED NURSE REASSESS COMMENT FT1
pt a& o x4 - L18g AC inserted and B/W and BC drawn x 2 - UA and C&s sent as per orders and meds administered - see MAR.  Sitting comfortably in chair. No further interventions at this time

## 2020-02-08 NOTE — ED PROVIDER NOTE - CARE PLAN
Principal Discharge DX:	Cough Principal Discharge DX:	Post-operative infection  Secondary Diagnosis:	Cough

## 2020-02-08 NOTE — ED PROVIDER NOTE - NS ED ROS FT
Constitutional: (-) fever, (-) chills  Eyes: (-) visual changes  ENT: (-) nasal congestions  Cardiovascular: (-) chest pain, (-) syncope  Respiratory: (+) cough, (-) shortness of breath, (-) dyspnea,   Gastrointestinal: (-) vomiting, (-) diarrhea, (-)nausea,  Musculoskeletal: (-) neck pain, (-) back pain, (-) joint pain,  Integumentary: (-) rash, (-) edema, (-) bruises  Neurological: (-) headache, (-) loc, (-) dizziness, (-) tingling, (-)numbness,  Peripheral Vascular: (-) leg swelling  :  (-)dysuria,  (-) hematuria  Allergic/Immunologic: (-) pruritus

## 2020-02-10 ENCOUNTER — EMERGENCY (EMERGENCY)
Facility: HOSPITAL | Age: 30
LOS: 0 days | Discharge: HOME | End: 2020-02-11
Attending: EMERGENCY MEDICINE | Admitting: EMERGENCY MEDICINE
Payer: OTHER GOVERNMENT

## 2020-02-10 VITALS
HEART RATE: 118 BPM | SYSTOLIC BLOOD PRESSURE: 131 MMHG | RESPIRATION RATE: 20 BRPM | HEIGHT: 71 IN | DIASTOLIC BLOOD PRESSURE: 72 MMHG | OXYGEN SATURATION: 97 % | WEIGHT: 194.01 LBS | TEMPERATURE: 99 F

## 2020-02-10 VITALS
HEART RATE: 76 BPM | SYSTOLIC BLOOD PRESSURE: 121 MMHG | RESPIRATION RATE: 19 BRPM | TEMPERATURE: 99 F | OXYGEN SATURATION: 96 % | DIASTOLIC BLOOD PRESSURE: 71 MMHG

## 2020-02-10 DIAGNOSIS — R09.81 NASAL CONGESTION: ICD-10-CM

## 2020-02-10 DIAGNOSIS — Q21.1 ATRIAL SEPTAL DEFECT: ICD-10-CM

## 2020-02-10 DIAGNOSIS — E78.5 HYPERLIPIDEMIA, UNSPECIFIED: ICD-10-CM

## 2020-02-10 DIAGNOSIS — Z79.82 LONG TERM (CURRENT) USE OF ASPIRIN: ICD-10-CM

## 2020-02-10 DIAGNOSIS — Z88.1 ALLERGY STATUS TO OTHER ANTIBIOTIC AGENTS STATUS: ICD-10-CM

## 2020-02-10 DIAGNOSIS — J10.1 INFLUENZA DUE TO OTHER IDENTIFIED INFLUENZA VIRUS WITH OTHER RESPIRATORY MANIFESTATIONS: ICD-10-CM

## 2020-02-10 DIAGNOSIS — Z86.73 PERSONAL HISTORY OF TRANSIENT ISCHEMIC ATTACK (TIA), AND CEREBRAL INFARCTION WITHOUT RESIDUAL DEFICITS: ICD-10-CM

## 2020-02-10 LAB
ALBUMIN SERPL ELPH-MCNC: 4.4 G/DL — SIGNIFICANT CHANGE UP (ref 3.5–5.2)
ALP SERPL-CCNC: 66 U/L — SIGNIFICANT CHANGE UP (ref 30–115)
ALT FLD-CCNC: 28 U/L — SIGNIFICANT CHANGE UP (ref 0–41)
ANION GAP SERPL CALC-SCNC: 12 MMOL/L — SIGNIFICANT CHANGE UP (ref 7–14)
AST SERPL-CCNC: 25 U/L — SIGNIFICANT CHANGE UP (ref 0–41)
BASOPHILS # BLD AUTO: 0.05 K/UL — SIGNIFICANT CHANGE UP (ref 0–0.2)
BASOPHILS NFR BLD AUTO: 0.6 % — SIGNIFICANT CHANGE UP (ref 0–1)
BILIRUB SERPL-MCNC: 0.6 MG/DL — SIGNIFICANT CHANGE UP (ref 0.2–1.2)
BUN SERPL-MCNC: 12 MG/DL — SIGNIFICANT CHANGE UP (ref 10–20)
CALCIUM SERPL-MCNC: 9.4 MG/DL — SIGNIFICANT CHANGE UP (ref 8.5–10.1)
CHLORIDE SERPL-SCNC: 97 MMOL/L — LOW (ref 98–110)
CO2 SERPL-SCNC: 25 MMOL/L — SIGNIFICANT CHANGE UP (ref 17–32)
CREAT SERPL-MCNC: 1.2 MG/DL — SIGNIFICANT CHANGE UP (ref 0.7–1.5)
CULTURE RESULTS: SIGNIFICANT CHANGE UP
EOSINOPHIL # BLD AUTO: 0.42 K/UL — SIGNIFICANT CHANGE UP (ref 0–0.7)
EOSINOPHIL NFR BLD AUTO: 4.8 % — SIGNIFICANT CHANGE UP (ref 0–8)
FLU A RESULT: POSITIVE
FLU A RESULT: POSITIVE
FLUAV AG NPH QL: POSITIVE
FLUBV AG NPH QL: NEGATIVE — SIGNIFICANT CHANGE UP
GLUCOSE SERPL-MCNC: 110 MG/DL — HIGH (ref 70–99)
HCT VFR BLD CALC: 42.6 % — SIGNIFICANT CHANGE UP (ref 42–52)
HGB BLD-MCNC: 14.3 G/DL — SIGNIFICANT CHANGE UP (ref 14–18)
IMM GRANULOCYTES NFR BLD AUTO: 0.3 % — SIGNIFICANT CHANGE UP (ref 0.1–0.3)
LACTATE SERPL-SCNC: 0.9 MMOL/L — SIGNIFICANT CHANGE UP (ref 0.7–2)
LYMPHOCYTES # BLD AUTO: 0.94 K/UL — LOW (ref 1.2–3.4)
LYMPHOCYTES # BLD AUTO: 10.7 % — LOW (ref 20.5–51.1)
MCHC RBC-ENTMCNC: 28.8 PG — SIGNIFICANT CHANGE UP (ref 27–31)
MCHC RBC-ENTMCNC: 33.6 G/DL — SIGNIFICANT CHANGE UP (ref 32–37)
MCV RBC AUTO: 85.9 FL — SIGNIFICANT CHANGE UP (ref 80–94)
MONOCYTES # BLD AUTO: 0.96 K/UL — HIGH (ref 0.1–0.6)
MONOCYTES NFR BLD AUTO: 11 % — HIGH (ref 1.7–9.3)
NEUTROPHILS # BLD AUTO: 6.36 K/UL — SIGNIFICANT CHANGE UP (ref 1.4–6.5)
NEUTROPHILS NFR BLD AUTO: 72.6 % — SIGNIFICANT CHANGE UP (ref 42.2–75.2)
NRBC # BLD: 0 /100 WBCS — SIGNIFICANT CHANGE UP (ref 0–0)
PLATELET # BLD AUTO: 206 K/UL — SIGNIFICANT CHANGE UP (ref 130–400)
POTASSIUM SERPL-MCNC: 3.9 MMOL/L — SIGNIFICANT CHANGE UP (ref 3.5–5)
POTASSIUM SERPL-SCNC: 3.9 MMOL/L — SIGNIFICANT CHANGE UP (ref 3.5–5)
PROT SERPL-MCNC: 6.9 G/DL — SIGNIFICANT CHANGE UP (ref 6–8)
RBC # BLD: 4.96 M/UL — SIGNIFICANT CHANGE UP (ref 4.7–6.1)
RBC # FLD: 12.6 % — SIGNIFICANT CHANGE UP (ref 11.5–14.5)
RSV RESULT: NEGATIVE — SIGNIFICANT CHANGE UP
RSV RNA RESP QL NAA+PROBE: NEGATIVE — SIGNIFICANT CHANGE UP
SODIUM SERPL-SCNC: 134 MMOL/L — LOW (ref 135–146)
SPECIMEN SOURCE: SIGNIFICANT CHANGE UP
TROPONIN T SERPL-MCNC: <0.01 NG/ML — SIGNIFICANT CHANGE UP
WBC # BLD: 8.76 K/UL — SIGNIFICANT CHANGE UP (ref 4.8–10.8)
WBC # FLD AUTO: 8.76 K/UL — SIGNIFICANT CHANGE UP (ref 4.8–10.8)

## 2020-02-10 PROCEDURE — 99284 EMERGENCY DEPT VISIT MOD MDM: CPT

## 2020-02-10 PROCEDURE — 71046 X-RAY EXAM CHEST 2 VIEWS: CPT | Mod: 26

## 2020-02-10 RX ORDER — SODIUM CHLORIDE 9 MG/ML
1000 INJECTION, SOLUTION INTRAVENOUS ONCE
Refills: 0 | Status: COMPLETED | OUTPATIENT
Start: 2020-02-10 | End: 2020-02-10

## 2020-02-10 RX ORDER — IBUPROFEN 200 MG
600 TABLET ORAL ONCE
Refills: 0 | Status: COMPLETED | OUTPATIENT
Start: 2020-02-10 | End: 2020-02-10

## 2020-02-10 RX ADMIN — Medication 600 MILLIGRAM(S): at 21:31

## 2020-02-10 RX ADMIN — SODIUM CHLORIDE 1000 MILLILITER(S): 9 INJECTION, SOLUTION INTRAVENOUS at 21:30

## 2020-02-10 RX ADMIN — Medication 600 MILLIGRAM(S): at 21:30

## 2020-02-10 NOTE — ED ADULT NURSE NOTE - OBJECTIVE STATEMENT
Pt s/p heart surgery on 2/6, sent by PMD to r/o flu. Pt c/o fever, cough and body aches x 3days. Pt on augmentin PO.

## 2020-02-10 NOTE — ED ADULT NURSE NOTE - NSIMPLEMENTINTERV_GEN_ALL_ED
Implemented All Universal Safety Interventions:  [place] to call system. Call bell, personal items and telephone within reach. Instruct patient to call for assistance. Room bathroom lighting operational. Non-slip footwear when patient is off stretcher. Physically safe environment: no spills, clutter or unnecessary equipment. Stretcher in lowest position, wheels locked, appropriate side rails in place.

## 2020-02-10 NOTE — ED ADULT TRIAGE NOTE - CHIEF COMPLAINT QUOTE
Patient s/p heart surgery ASD repair 02/06 and now complaining of fever, cough. Was recently seen and prescribed abx without improvement. Alert and oriented to person, place and time

## 2020-02-10 NOTE — ED ADULT NURSE NOTE - CHIEF COMPLAINT QUOTE
Patient s/p heart surgery ASD repair 02/06 and now complaining of fever, cough. Was recently seen and prescribed abx without improvement.

## 2020-02-11 NOTE — ED PROVIDER NOTE - PATIENT PORTAL LINK FT
You can access the FollowMyHealth Patient Portal offered by [facility] by registering at the following website: [email or web address]. By joining [employer]’s FollowMyHealth portal, you will also be able to view your health information using other applications (apps) compatible with our system.

## 2020-02-11 NOTE — ED PROVIDER NOTE - PHYSICAL EXAMINATION
VITAL SIGNS: I have reviewed nursing notes and confirm.  CONSTITUTIONAL: Well-developed; well-nourished; in no acute distress.  SKIN: Skin exam is warm and dry, no acute rash.  HEAD: Normocephalic; atraumatic.  EYES: PERRL, EOM intact; conjunctiva and sclera clear.  ENT: No nasal discharge; airway clear.  NECK: Supple; non tender.  CARD: S1, S2 normal; no murmurs, gallops, or rubs. tachy 110s reg rhythm  RESP: No wheezes, rales or rhonchi.  ABD: Normal bowel sounds; soft; non-distended; non-tender; no hepatosplenomegaly; no rgr.  BACK: non-tender, no CVAT  EXT: Normal ROM. No clubbing, cyanosis or edema. DPI.  NEURO: Alert, oriented. Grossly unremarkable. No focal deficits.  PSYCH: Cooperative, appropriate.

## 2020-02-11 NOTE — ED PROVIDER NOTE - PROGRESS NOTE DETAILS
s/p pt's Cardiologist Dr. Griffith from [facility], results discussed (+flu A), pt ok for discharged w/OP f/u

## 2020-02-11 NOTE — ED PROVIDER NOTE - NS ED ROS FT
Constitutional: see hpi  Eyes:  No visual changes, eye pain or discharge.  ENMT:  see hpi  Cardiac:  No chest pain, SOB or edema. No chest pain with exertion.  Respiratory:  +cough no respiratory distress. No hemoptysis. No history of asthma or RAD.  GI:  No nausea, vomiting, diarrhea or abdominal pain.  :  No dysuria, frequency or burning.  MS:  No myalgia, muscle weakness, joint pain or back pain.  Neuro:  No headache or weakness.  No LOC.  Skin:  No skin rash.   Endocrine: No history of thyroid disease or diabetes.

## 2020-02-11 NOTE — ED PROVIDER NOTE - CARE PROVIDER_API CALL
PCP,   your PCP as scheduled next week  Phone: (   )    -  Fax: (   )    -  Established Patient  Follow Up Time:     Jason Griffith)  Cardiology; Interventional Cardiology  [address] [place] [address]  Phone: [phone]  Fax: [phone]  Established Patient  Follow Up Time: Routine

## 2020-02-11 NOTE — ED PROVIDER NOTE - CLINICAL SUMMARY MEDICAL DECISION MAKING FREE TEXT BOX
flu A, sx <48h - tamiflu, return precautions discussed, OP f/u with private PMD and Cardio (recent ASD repair)

## 2020-02-11 NOTE — ED PROVIDER NOTE - CARE PROVIDERS DIRECT ADDRESSES
,DirectAddress_Unknown,[email or web address]

## 2020-02-11 NOTE — ED PROVIDER NOTE - PROVIDER TOKENS
FREE:[LAST:[PCP],PHONE:[(   )    -],FAX:[(   )    -],ADDRESS:[your PCP as scheduled next week],ESTABLISHEDPATIENT:[T]],PROVIDER:[TOKEN:[[id number]],FOLLOWUP:[Routine],ESTABLISHEDPATIENT:[T]]

## 2020-02-14 LAB
CULTURE RESULTS: SIGNIFICANT CHANGE UP
CULTURE RESULTS: SIGNIFICANT CHANGE UP
SPECIMEN SOURCE: SIGNIFICANT CHANGE UP
SPECIMEN SOURCE: SIGNIFICANT CHANGE UP

## 2020-02-19 ENCOUNTER — APPOINTMENT (OUTPATIENT)
Dept: CARDIOTHORACIC SURGERY | Facility: CLINIC | Age: 30
End: 2020-02-19
Payer: OTHER GOVERNMENT

## 2020-02-19 VITALS — DIASTOLIC BLOOD PRESSURE: 72 MMHG | SYSTOLIC BLOOD PRESSURE: 120 MMHG | OXYGEN SATURATION: 100 % | HEART RATE: 73 BPM

## 2020-02-19 PROCEDURE — 99214 OFFICE O/P EST MOD 30 MIN: CPT

## 2020-02-20 ENCOUNTER — OUTPATIENT (OUTPATIENT)
Dept: OUTPATIENT SERVICES | Facility: HOSPITAL | Age: 30
LOS: 1 days | Discharge: HOME | End: 2020-02-20

## 2020-02-20 ENCOUNTER — APPOINTMENT (OUTPATIENT)
Dept: HEMATOLOGY ONCOLOGY | Facility: CLINIC | Age: 30
End: 2020-02-20
Payer: OTHER GOVERNMENT

## 2020-02-20 VITALS
HEIGHT: 71 IN | BODY MASS INDEX: 27.3 KG/M2 | TEMPERATURE: 97.2 F | SYSTOLIC BLOOD PRESSURE: 146 MMHG | DIASTOLIC BLOOD PRESSURE: 77 MMHG | HEART RATE: 91 BPM | WEIGHT: 195 LBS

## 2020-02-20 DIAGNOSIS — G45.9 TRANSIENT CEREBRAL ISCHEMIC ATTACK, UNSPECIFIED: ICD-10-CM

## 2020-02-20 PROCEDURE — 99212 OFFICE O/P EST SF 10 MIN: CPT

## 2020-02-20 NOTE — HISTORY OF PRESENT ILLNESS
[FreeTextEntry1] : 29 year old male with a history of hyperlipidemia, TIA and atrial septal defect who underwent an ASD closure on 2/6/2020 who presents for follow up after discharge. \par \par The patient tolerated the procedure well without any complications and was discharged home on POD # 1. \par \par After discharge, the patient presented to [facility] on 2/8/2020 and 2/10/2020 with worsening cough, fever, and nasal congestion.  The patient was diagnosed with the flu and prescribed Tamiflu.  \par \par Now, the patient feels well with no complaints. He denies any SOB at rest or with exertion, chest pain, palpitations, dizziness, syncope, or LE edema. He also denies any new neurological events.

## 2020-02-20 NOTE — HISTORY OF PRESENT ILLNESS
[de-identified] : \par 28 y/o left handed man was referred by Dr Puente for further evaluation and management of positive lupus anticoagulant in the setting of a TIA. \par \par Pt had presented to ER in 8/14/2019 with expressive aphasia approx 4 hours prior to presentation accompanied by right face, arm and leg numbness, all of which resolved prior to arrival.. He was kept for workup of TIA and had an MRI brain which was negative and CTA of Head and neck which was unremarkable. LDL was 127 and hypercoagulable labs were negative with only the DRVVT S/C ratio being positive. Proothrombin mutation and factor V leydein were normal. TTE with bubble showed a pfo. RON showed small septum secondum defect. Venous duplex negative. EEG was normal. He has had no episodes since this episode. He feels completely normal.\par \par Pt denies having previous episode of stroke. No h/o DVT or heart disease. Family history unknown as the pt was adopted. No previous h/o cancer or recent weight loss. No joint disease or rash. \par \par  [de-identified] : 02/20/20 patient returns after ASD closure for suspected TIA , Transcranial doppler showed significant right to left shunt .  he feels well and continues on plavix/asa for one month.

## 2020-02-20 NOTE — PHYSICAL EXAM
[General Appearance - In No Acute Distress] : no acute distress [Normal Conjunctiva] : the conjunctiva exhibited no abnormalities [Normal Jugular Venous A Waves Present] : normal jugular venous A waves present [Normal Jugular Venous V Waves Present] : normal jugular venous V waves present [Respiration, Rhythm And Depth] : normal respiratory rhythm and effort [Auscultation Breath Sounds / Voice Sounds] : lungs were clear to auscultation bilaterally [Exaggerated Use Of Accessory Muscles For Inspiration] : no accessory muscle use [Heart Sounds] : normal S1 and S2 [Heart Rate And Rhythm] : heart rate and rhythm were normal [Murmurs] : no murmurs present [Edema] : no peripheral edema present [Bowel Sounds] : normal bowel sounds [Abdomen Soft] : soft [Abdomen Tenderness] : non-tender [Abnormal Walk] : normal gait [Cyanosis, Localized] : no localized cyanosis [] : no rash [Oriented To Time, Place, And Person] : oriented to person, place, and time [FreeTextEntry1] : bilateral groins, no hematoma, no bruit, pulses intact to baseline

## 2020-02-20 NOTE — ASSESSMENT
[FreeTextEntry1] : 30 y/o M with TIA. Extensive work up has been done including MRI, CTA and Holter monitoring. \par Had Positive LA testing with negative cardiolipin and beta 2 glycoprotein abs. Repeat LA was negative, \par s/p closure of ASD . \par neurologically intact . \par follow up with cardiology .

## 2020-03-03 RX ORDER — ATORVASTATIN CALCIUM 20 MG/1
20 TABLET, FILM COATED ORAL
Qty: 30 | Refills: 0 | Status: ACTIVE | COMMUNITY
Start: 1900-01-01 | End: 1900-01-01

## 2020-03-03 RX ORDER — PANTOPRAZOLE 40 MG/1
40 TABLET, DELAYED RELEASE ORAL DAILY
Qty: 30 | Refills: 2 | Status: ACTIVE | COMMUNITY
Start: 2020-03-03 | End: 1900-01-01

## 2020-03-03 RX ORDER — CLOPIDOGREL BISULFATE 75 MG/1
75 TABLET, FILM COATED ORAL DAILY
Qty: 30 | Refills: 2 | Status: ACTIVE | COMMUNITY
Start: 1900-01-01 | End: 1900-01-01

## 2020-03-08 ENCOUNTER — FORM ENCOUNTER (OUTPATIENT)
Age: 30
End: 2020-03-08

## 2020-03-09 ENCOUNTER — APPOINTMENT (OUTPATIENT)
Dept: CARDIOTHORACIC SURGERY | Facility: CLINIC | Age: 30
End: 2020-03-09
Payer: OTHER GOVERNMENT

## 2020-03-09 ENCOUNTER — OUTPATIENT (OUTPATIENT)
Dept: OUTPATIENT SERVICES | Facility: HOSPITAL | Age: 30
LOS: 1 days | End: 2020-03-09
Payer: OTHER GOVERNMENT

## 2020-03-09 VITALS
BODY MASS INDEX: 27.72 KG/M2 | RESPIRATION RATE: 18 BRPM | SYSTOLIC BLOOD PRESSURE: 122 MMHG | HEIGHT: 71 IN | OXYGEN SATURATION: 95 % | DIASTOLIC BLOOD PRESSURE: 70 MMHG | HEART RATE: 99 BPM | WEIGHT: 198 LBS | TEMPERATURE: 97.9 F

## 2020-03-09 DIAGNOSIS — Q21.1 ATRIAL SEPTAL DEFECT: ICD-10-CM

## 2020-03-09 PROCEDURE — 99214 OFFICE O/P EST MOD 30 MIN: CPT

## 2020-03-09 PROCEDURE — 93017 CV STRESS TEST TRACING ONLY: CPT

## 2020-03-09 PROCEDURE — 93306 TTE W/DOPPLER COMPLETE: CPT | Mod: 26

## 2020-03-09 PROCEDURE — 93306 TTE W/DOPPLER COMPLETE: CPT

## 2020-03-10 NOTE — PHYSICAL EXAM
[General Appearance - In No Acute Distress] : no acute distress [Normal Conjunctiva] : the conjunctiva exhibited no abnormalities [Normal Jugular Venous A Waves Present] : normal jugular venous A waves present [Normal Jugular Venous V Waves Present] : normal jugular venous V waves present [Respiration, Rhythm And Depth] : normal respiratory rhythm and effort [Exaggerated Use Of Accessory Muscles For Inspiration] : no accessory muscle use [Auscultation Breath Sounds / Voice Sounds] : lungs were clear to auscultation bilaterally [Heart Rate And Rhythm] : heart rate and rhythm were normal [Heart Sounds] : normal S1 and S2 [Murmurs] : no murmurs present [Edema] : no peripheral edema present [Abdomen Soft] : soft [Bowel Sounds] : normal bowel sounds [Abnormal Walk] : normal gait [Abdomen Tenderness] : non-tender [Cyanosis, Localized] : no localized cyanosis [Oriented To Time, Place, And Person] : oriented to person, place, and time [] : no rash [Nail Clubbing] : no clubbing of the fingernails

## 2020-03-12 NOTE — HISTORY OF PRESENT ILLNESS
[FreeTextEntry1] : 29 year old male with a history of hyperlipidemia, TIA and atrial septal defect who underwent an ASD closure on 2/6/2020 who presents for one month follow up. \par \par The patient states he is feeling well with no complaints and denies any SOB at rest or with exertion, chest pain, palpitations, dizziness, syncope, or LE edema.  He denies any new neurological deficits as well.

## 2020-03-19 PROCEDURE — 85730 THROMBOPLASTIN TIME PARTIAL: CPT

## 2020-03-19 PROCEDURE — 84100 ASSAY OF PHOSPHORUS: CPT

## 2020-03-19 PROCEDURE — 80048 BASIC METABOLIC PNL TOTAL CA: CPT

## 2020-03-19 PROCEDURE — 86850 RBC ANTIBODY SCREEN: CPT

## 2020-03-19 PROCEDURE — 93312 ECHO TRANSESOPHAGEAL: CPT

## 2020-03-19 PROCEDURE — 36415 COLL VENOUS BLD VENIPUNCTURE: CPT

## 2020-03-19 PROCEDURE — 85025 COMPLETE CBC W/AUTO DIFF WBC: CPT

## 2020-03-19 PROCEDURE — 80053 COMPREHEN METABOLIC PANEL: CPT

## 2020-03-19 PROCEDURE — 86901 BLOOD TYPING SEROLOGIC RH(D): CPT

## 2020-03-19 PROCEDURE — 93005 ELECTROCARDIOGRAM TRACING: CPT

## 2020-03-19 PROCEDURE — 83735 ASSAY OF MAGNESIUM: CPT

## 2020-03-19 PROCEDURE — 83880 ASSAY OF NATRIURETIC PEPTIDE: CPT

## 2020-03-19 PROCEDURE — C1894: CPT

## 2020-03-19 PROCEDURE — 93306 TTE W/DOPPLER COMPLETE: CPT

## 2020-03-19 PROCEDURE — C1817: CPT

## 2020-03-19 PROCEDURE — C1769: CPT

## 2020-03-19 PROCEDURE — 85610 PROTHROMBIN TIME: CPT

## 2020-03-19 PROCEDURE — 86923 COMPATIBILITY TEST ELECTRIC: CPT

## 2020-03-19 PROCEDURE — 82962 GLUCOSE BLOOD TEST: CPT

## 2020-03-19 PROCEDURE — C1889: CPT

## 2020-03-19 PROCEDURE — C1887: CPT

## 2020-03-19 PROCEDURE — 85027 COMPLETE CBC AUTOMATED: CPT

## 2020-03-19 PROCEDURE — 71045 X-RAY EXAM CHEST 1 VIEW: CPT

## 2020-04-03 ENCOUNTER — APPOINTMENT (OUTPATIENT)
Dept: CARDIOLOGY | Facility: CLINIC | Age: 30
End: 2020-04-03
Payer: OTHER GOVERNMENT

## 2020-04-03 DIAGNOSIS — G45.9 TRANSIENT CEREBRAL ISCHEMIC ATTACK, UNSPECIFIED: ICD-10-CM

## 2020-04-03 DIAGNOSIS — Q21.1 ATRIAL SEPTAL DEFECT: ICD-10-CM

## 2020-04-03 DIAGNOSIS — E78.5 HYPERLIPIDEMIA, UNSPECIFIED: ICD-10-CM

## 2020-04-03 PROCEDURE — 99213 OFFICE O/P EST LOW 20 MIN: CPT | Mod: 95

## 2020-04-03 NOTE — PHYSICAL EXAM
[General Appearance - Well Developed] : well developed [Normal Appearance] : normal appearance [Well Groomed] : well groomed [General Appearance - Well Nourished] : well nourished [No Deformities] : no deformities [General Appearance - In No Acute Distress] : no acute distress [Normal Jugular Venous V Waves Present] : normal jugular venous V waves present [Skin Color & Pigmentation] : normal skin color and pigmentation

## 2020-04-03 NOTE — ASSESSMENT
[FreeTextEntry1] : 30 yo male with pmhx and presentation as above\par TIA\par asd, s/p closure\par dyslipidemia\par cont meds\par repeat echo 1 year\par aggressive risk modif\par diet and act as tolerated\par rtc 6-9 monhs\par \par TELE health visit\par pt verbally consented to the visit

## 2020-04-03 NOTE — HISTORY OF PRESENT ILLNESS
[FreeTextEntry1] :  28 yo male with pmhx as below is here for a f/up visit\par 2019 admission to [facility] with TIA, RON revealed small asd with bidirectional flow\par MRI/CT head/hypercoag w/up unrem\par seen by Dr. Griffith, s/p asd closure\par no active cvs complains\par et is excellent\par ros is otherwise as below

## 2020-08-13 NOTE — ED ADULT TRIAGE NOTE - TEMPERATURE IN FAHRENHEIT (DEGREES F)
99.2 Complex Repair And Xenograft Text: The defect edges were debeveled with a #15 scalpel blade.  The primary defect was closed partially with a complex linear closure.  Given the location of the defect, shape of the defect and the proximity to free margins a xenograft was deemed most appropriate to repair the remaining defect.  The graft was trimmed to fit the size of the remaining defect.  The graft was then placed in the primary defect, oriented appropriately, and sutured into place.

## 2020-09-24 ENCOUNTER — TELEPHONE (OUTPATIENT)
Dept: CARDIOLOGY CLINIC | Age: 30
End: 2020-09-24

## 2020-09-24 NOTE — TELEPHONE ENCOUNTER
Called patient to request that he bring records with him to his appointment. Unable to leave message; voice mail was full.

## 2020-09-25 ENCOUNTER — OFFICE VISIT (OUTPATIENT)
Dept: CARDIOLOGY CLINIC | Age: 30
End: 2020-09-25
Payer: OTHER GOVERNMENT

## 2020-09-25 VITALS
DIASTOLIC BLOOD PRESSURE: 82 MMHG | SYSTOLIC BLOOD PRESSURE: 120 MMHG | HEIGHT: 71 IN | BODY MASS INDEX: 28.42 KG/M2 | OXYGEN SATURATION: 98 % | HEART RATE: 74 BPM | WEIGHT: 203 LBS

## 2020-09-25 DIAGNOSIS — Z86.79 HISTORY OF ATRIAL SEPTAL DEFECT: ICD-10-CM

## 2020-09-25 DIAGNOSIS — Q21.10 ASD (ATRIAL SEPTAL DEFECT): Primary | ICD-10-CM

## 2020-09-25 DIAGNOSIS — Q21.10 ASD (ATRIAL SEPTAL DEFECT): ICD-10-CM

## 2020-09-25 PROCEDURE — 99204 OFFICE O/P NEW MOD 45 MIN: CPT | Performed by: INTERNAL MEDICINE

## 2020-09-25 PROCEDURE — 93000 ELECTROCARDIOGRAM COMPLETE: CPT | Performed by: INTERNAL MEDICINE

## 2020-09-25 RX ORDER — GUAIFENESIN 100 MG/5ML
81 LIQUID (ML) ORAL DAILY
COMMUNITY

## 2020-09-25 NOTE — PROGRESS NOTES
[facility]    Mr. Tiffani Cabrera is 80-year-old male with a history of ASD, CVA, status post ASD closure in February 2020    Patient is here today for initial cardiac evaluation. In January 2020, patient had symptoms concerning for TIA. During the work-up, patient was found to have 5 mm ASD. He eventually undergo ASD closure device percutaneously in February 2020 in [address]. This is according to patient. No records are available at this time. He was told that he should have another echocardiogram after 6-month. Patient has felt fine since procedure he denies any symptoms that is concerning for angina or heart failure. He denies any palpitation, presyncope or syncope. He denies any specific cardiac complaint whatsoever at this time. No PND or lower extremity swelling. He is taking aspirin  Denies any nausea, vomiting, abdominal pain, fever, chills, sputum production. No hematuria or other bleeding complaints    Past Medical History:   Diagnosis Date    ASD (atrial septal defect)     H/O Amplatzer atrial septal defect closure 06/2020    At [place], [place]    TIA (transient ischemic attack) 2020       Review of Systems:  Cardiac symptoms as noted above in HPI. All others negative. Denies fatigue, malaise, skin rash, joint pain, blurring vision, photophobia, neck pain, hemoptysis, chronic cough, nausea, vomiting, hematuria, burning micturition, BRBPR, chronic headaches. Current Outpatient Medications   Medication Sig    aspirin 81 mg chewable tablet Take 81 mg by mouth daily. No current facility-administered medications for this visit. No past surgical history on file. Allergies and Sensitivities:  Allergies   Allergen Reactions    Tetracycline Hives       Family History:  No family history on file.     Social History:  Social History     Tobacco Use    Smoking status: Never Smoker    Smokeless tobacco: Never Used   Substance Use Topics    Alcohol use: Yes     Frequency: 2-3 times a week     Drinks per session: 1 or 2     Binge frequency: Never    Drug use: Not on file     He  reports that he has never smoked. He has never used smokeless tobacco.  He  reports current alcohol use. Physical Exam:  BP Readings from Last 3 Encounters:   09/25/20 120/82         Pulse Readings from Last 3 Encounters:   09/25/20 74          Wt Readings from Last 3 Encounters:   09/25/20 92.1 kg (203 lb)       Constitutional: Oriented to person, place, and time. HENT: Head: Normocephalic and atraumatic. Eyes: Conjunctivae and extraocular motions are normal.   Neck: No JVD present. Carotid bruit is not appreciated. Cardiovascular: Regular rhythm. No murmur, gallop or rubs appreciated  Lung: Breath sounds normal. No respiratory distress. No ronchi or rales appreciated  Abdominal: No tenderness. No rebound and no guarding. Musculoskeletal: There is no lower extremity edema. No cynosis  Lymphadenopathy:  No cervical or supraclavicular adenopathy appriciated. Neurological: No gross motor deficit noted. Skin: No visible skin rash noted. No Ear discharge noted  Psychiatric: Normal mood and affect. Good distal pulse    LABS:   No results found for: NA, K, CL, CO2, GLU, BUN, CREA  No flowsheet data found. No results found for: ALT  No results found for: HBA1C, HGBE8, AAC2GLHZ, CBQ0LMZS  No results found for: TSH, TSH2, TSH3, TSHP, TSHEXT    EKG rhythm at 74 beats minute. Normal axis. No ST changes of ischemia    ECHO    STRESS TEST (EST, PHARM, NUC, ECHO etc)    CATHETERIZATION    IMPRESSION & PLAN:    Mr. Cristina Preston is 80-year-old male who had a CVA in 2020. Work-up revealed likely secondary ASD. He underwent percutaneous Amplatzer closure device placement for ASD in February 2020. This was done in [place]. We do not have any of this record.   This is according to patient  On exam he does not have any fluid overload or any murmur  He remains on aspirin  Has informed patient that he will need antibiotic prophylaxis for any dental or  procedure. Will order echo to confirm if he has any residual shunting or not. Importance of diet and exercise was discussed with patient. This plan was discussed with patient who is in agreement. Thank you for allowing me to participate in patient care. Please feel free to call me if you have any question or concern. Efe Gallegos MD  Please note: This document has been produced using voice recognition software. Unrecognized errors in transcription may be present.

## 2020-09-25 NOTE — LETTER
9/25/20 Patient: Jan Schumacher YOB: 1990 Date of Visit: 9/25/2020 ZOHRA Hope 
[address] [place] [address] VIA Facsimile: [phone] Dear ZOHRA Hope, Thank you for referring Mr. Jan Schumacher to [address] [place] [address] for evaluation. My notes for this consultation are attached. If you have questions, please do not hesitate to call me. I look forward to following your patient along with you. Sincerely, Ruma Franco MD

## 2020-09-25 NOTE — PROGRESS NOTES
Domenic Hagan presents today for   Chief Complaint   Patient presents with    Heart Problem     HX OF ASD CLOSURE IN FEB 2020 - Amplatzer Septal Occluder . Previous cardiologist in [place] from Aug 2019 to June 2020 - no cardiac complaints        Domenic Hagan preferred language for health care discussion is english/other. Is someone accompanying this pt? no    Is the patient using any DME equipment during OV? no    Depression Screening:  No flowsheet data found. Learning Assessment:  Learning Assessment 9/25/2020   PRIMARY LEARNER Patient   HIGHEST LEVEL OF EDUCATION - PRIMARY LEARNER  SOME COLLEGE   BARRIERS PRIMARY LEARNER NONE   CO-LEARNER CAREGIVER No   PRIMARY LANGUAGE ENGLISH   LEARNER PREFERENCE PRIMARY DEMONSTRATION   LEARNING SPECIAL TOPICS no   ANSWERED BY patient   RELATIONSHIP SELF       Abuse Screening:  Abuse Screening Questionnaire 9/25/2020   Do you ever feel afraid of your partner? N   Are you in a relationship with someone who physically or mentally threatens you? N   Is it safe for you to go home? Y       Fall Risk  Fall Risk Assessment, last 12 mths 9/25/2020   Able to walk? Yes   Fall in past 12 months? No       Pt currently taking Anticoagulant therapy? no    Coordination of Care:  1. Have you been to the ER, urgent care clinic since your last visit? Hospitalized since your last visit? no    2. Have you seen or consulted any other health care providers outside of the [address] [place] [address] since your last visit? Include any pap smears or colon screening.  no

## 2020-10-02 LAB
ECHO AO ROOT DIAM: 3.38 CM
ECHO LA AREA 4C: 15.61 CM2
ECHO LA VOL 2C: 55.69 ML (ref 18–58)
ECHO LA VOL 4C: 35.21 ML (ref 18–58)
ECHO LA VOL BP: 49.89 ML (ref 18–58)
ECHO LA VOL/BSA BIPLANE: 23.51 ML/M2 (ref 16–28)
ECHO LA VOLUME INDEX A2C: 26.24 ML/M2 (ref 16–28)
ECHO LA VOLUME INDEX A4C: 16.59 ML/M2 (ref 16–28)
ECHO LV E' LATERAL VELOCITY: 14.58 CM/S
ECHO LV E' SEPTAL VELOCITY: 11.03 CM/S
ECHO LV INTERNAL DIMENSION DIASTOLIC: 5.2 CM (ref 4.2–5.9)
ECHO LV INTERNAL DIMENSION SYSTOLIC: 3.03 CM
ECHO LV IVSD: 0.86 CM (ref 0.6–1)
ECHO LV MASS 2D: 169 G (ref 88–224)
ECHO LV MASS INDEX 2D: 79.6 G/M2 (ref 49–115)
ECHO LV POSTERIOR WALL DIASTOLIC: 0.94 CM (ref 0.6–1)
ECHO LVOT CARDIAC OUTPUT: 4.36 LITER/MINUTE
ECHO LVOT DIAM: 2.29 CM
ECHO LVOT PEAK GRADIENT: 3.29 MMHG
ECHO LVOT PEAK VELOCITY: 90.64 CM/S
ECHO LVOT SV: 72.1 ML
ECHO LVOT VTI: 17.45 CM
ECHO MV A VELOCITY: 52.7 CM/S
ECHO MV E DECELERATION TIME (DT): 0.18 S
ECHO MV E VELOCITY: 80.56 CM/S
ECHO MV E/A RATIO: 1.53
ECHO MV E/E' LATERAL: 5.53
ECHO MV E/E' RATIO (AVERAGED): 6.41
ECHO MV E/E' SEPTAL: 7.3
ECHO RV TAPSE: 2.56 CM (ref 1.5–2)
ECHO TV REGURGITANT MAX VELOCITY: 230.62 CM/S
ECHO TV REGURGITANT PEAK GRADIENT: 21.27 MMHG
LVOT MG: 1.45 MMHG

## 2020-11-09 NOTE — ED PROVIDER NOTE - CRITICAL CARE ATTESTATION
Subjective   Alberta Suarez is a 83 y.o. female.     Chief Complaint   Patient presents with   • Imaging Only     f/u on MRI ordered 10/20/20       History of Present Illness   Patient here for follow-up.  Right lower back pain radiated to right upper abdomen for 3 weeks.  Pain is severe achy dull achy pain no cramping pain no nausea vomiting no diarrhea no constipation history of a constipation.  Oxycodone helps some.  Pain does not radiate to right leg MRI showed arthritis mild to moderate no significant radiculopathy.  No numbness in lower extremity.    Current Outpatient Medications:   •  calcium-vitamin D (OSCAL 500/200 D-3) 500-200 MG-UNIT per tablet, Take 1 tablet by mouth Daily., Disp: 30 tablet, Rfl: 0  •  lisinopril (PRINIVIL,ZESTRIL) 10 MG tablet, TAKE 1 TABLET BY MOUTH DAILY., Disp: 30 tablet, Rfl: 5  •  omeprazole (priLOSEC) 40 MG capsule, Take 1 capsule by mouth 2 (Two) Times a Day., Disp: 60 capsule, Rfl: 3  •  ondansetron (ZOFRAN) 4 MG tablet, Take 1 tablet by mouth Every 8 (Eight) Hours As Needed for Nausea or Vomiting., Disp: 20 tablet, Rfl: 0  •  oxyCODONE (ROXICODONE) 5 MG immediate release tablet, Take 1 tablet by mouth Every 8 (Eight) Hours As Needed for Moderate Pain ., Disp: 30 tablet, Rfl: 0    The following portions of the patient's history were reviewed and updated as appropriate: allergies, current medications, past family history, past medical history, past social history, past surgical history and problem list.    Review of Systems   Constitutional: Negative.    Respiratory: Negative.    Cardiovascular: Negative.    Gastrointestinal: Positive for abdominal pain. Negative for diarrhea, nausea and vomiting.   Musculoskeletal: Positive for back pain. Negative for gait problem.   Skin: Negative.    Neurological: Negative.    Psychiatric/Behavioral: Negative.        Objective   Physical Exam  Neck:      Musculoskeletal: Neck supple.   Cardiovascular:      Rate and Rhythm: Normal rate and  regular rhythm.      Heart sounds: Normal heart sounds.   Pulmonary:      Effort: Pulmonary effort is normal.      Breath sounds: Normal breath sounds.   Abdominal:      General: Bowel sounds are normal. There is no distension.      Palpations: There is no mass.      Tenderness: There is abdominal tenderness ( right upper quandrant deep palpation tender). There is no right CVA tenderness, left CVA tenderness or guarding.      Hernia: No hernia is present.   Skin:     General: Skin is warm.   Neurological:      Mental Status: She is alert and oriented to person, place, and time.         All tests have been reviewed.    Assessment/Plan   Diagnoses and all orders for this visit:    Right upper quadrant abdominal pain pending labs  -     CT Abdomen Pelvis With & Without Contrast      Lumbar radiculopathy MRI showed mild to moderate arthritis     Vitamin D deficiency, unspecified   -     Vitamin D 25 Hydroxy    10 days      I have personally provided the amount of critical care time documented below concurrently with the resident/fellow.  This time excludes time spent on separate procedures and time spent teaching. I have reviewed the resident’s/fellow’s documentation and I agree with the assessment and plan of care

## 2021-01-01 NOTE — PATIENT PROFILE ADULT - HEALTH LITERACY
Lactation Progress Note  Initial Consult    Data: Referral received per RN. Action: LC to room. Mother resting in bed. Infant sleeping, swaddled in FOB's arms, showing no hunger cues at this time. Mother states agreeable to consult from [facility] at this time. I reviewed Care Plan for First 24 Hours of Life already in patient binder. Discussed recognizing hunger cues and offering the breast when cues are shown. Encouraged breastfeeding on demand and attempting/offering at least every 3 hours. Informed infant may have one 5 hour stretch of sleep in a 24 hour period. Encouraged unlimited skin to skin contact with infant and reviewed benefits including better temperature, heart rate, respiration, blood pressure, and blood sugar regulation. Also increased bonding and milk supply associated with skin to skin contact. Discussed feeding positions, latch on techniques, signs of milk transfer, output goals and normal feeding/sleeping behaviors. I referred mother to binder for additional information about breastfeeding and skin to skin contact. Discussed hand expression and encouraged mother to practice getting drops to infant today. Mother  two previous children. Mother denied through insurance for new breast pump. Gave pump kit so that mother will have new tubing/parts to use with her old breast pump. Gave resources for reverse pressure softening and breastfeeding support after discharge. I wrote my name and circled the phone number on patient's whiteboard, provided a lactation consultant business card, directed mother to [facility] [employer] for evidence based information, and encouraged mother to call with any lactation needs. Response: Mother verbalizes understanding of information given and denies further needs at this time. no

## 2021-01-11 ENCOUNTER — TELEPHONE (OUTPATIENT)
Dept: CARDIOLOGY CLINIC | Age: 31
End: 2021-01-11

## 2021-01-11 NOTE — TELEPHONE ENCOUNTER
Patient called with concerns that he has noticed more hard to form words and concentrate. Also having tingling in fingers and a pulling,pitching  sensation  In the left underarm area.      Verbal order and read back per Crystal Clemons MD  Doesn't sound cardiac to follow up with PCP for further evaluation     Patient aware of instructions and verbalized understanding

## 2021-01-20 NOTE — ED PROVIDER NOTE - RELIEVING FACTORS
none Mohs Rapid Report Verbiage: The area of clinically evident tumor was marked with skin marking ink and appropriately hatched.  The initial incision was made following the Mohs approach through the skin.  The specimen was taken to the lab, divided into the necessary number of pieces, chromacoded and processed according to the Mohs protocol.  This was repeated in successive stages until a tumor free defect was achieved.

## 2021-09-30 NOTE — ED PROVIDER NOTE - CARE PLAN
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    Teaching Topic: First discharge post-transplant education    Person(s) involved in teaching: Mother    BMT Physician: Joshua Beltran MD  Outpatient Nurse Coordinator: Kylee Meza RN  Inpatient Nurse Coordinator: Lakisha Black RN    Home Infusion: [facility]  Education Classes Completed: CVC, TPN and IV Acetylcysteine  DME Training Completed: N/A    Motivation Level  Asks Questions: Yes  Eager to Learn: Yes  Cooperative: Yes  Receptive (willing/able to accept information): Yes  Any cultural factors/Yarsani beliefs that may influence understanding or compliance? No    Mother demonstrates understanding of the following:   - Reason for the discharge teaching and treatment plan: Yes  - Knowledge of proper use of medications and conditions for which they are ordered (with special attention to potential side effects or drug interactions): Yes  - Which situations necessitate calling provider and whom to contact: Yes. Numbers provided:   - BMT Fellow On-Call: [phone]   - [facility]: [phone]   - BMT Triage: [phone]  - Proper use and care of (medical equipment, care aids, etc.): Yes  - Pain management techniques: Yes  - How and/when to access resources: Yes    Infection Control  The mother was educated on:  - Hand hygiene: Yes   - Mask guidelines: Yes   - Places and activities to avoid: Yes   - Central venous catheter care: Yes  - Signs and symptoms of infection: Yes  - Surgical procedure site care: N/A  - Wound care: N/A    Instructional Materials Used/Given: When to Call for Help and After You Leave the Hospital materials provided.     Teaching Concerns Addressed: All concerns addressed.    Plan: Plan to follow-up in clinic tomorrow. Outpatient team to continue to educate, as needed.    Principal Discharge DX:	TIA (transient ischemic attack)

## 2023-06-16 NOTE — ED ADULT NURSE NOTE - CAS EDP DISCH TYPE
Last Appointment:  10/31/2022  Future Appointments  7/11/2023  2:40 PM    MD Arelis Claudio [facility]        [facility]

## 2023-06-19 NOTE — ED PROVIDER NOTE - OBJECTIVE STATEMENT
29y m h/o tia no resid deficits on asa/plavix, asd/pfo s/p repair by Cardio Dr. Griffith @ [facility] 2/6 p/w fever & cough x 2d. Accomp by nasal congestion. Seen in yest for same, labs wnl cxr neg, given Rx for augmentin for sinus inf and discharged to home. Febrile again today, called Dr. Griffith who advised pt to come to ED and chk for flu, if neg transfer him to [facility] for further care. Pt denies any neck pain or stiffness, sore throat, nvd, abd pain, flank pain, urinary sx, rash. No sick contacts, recent travel or abx. 169.9

## 2024-10-26 NOTE — PRE-OP CHECKLIST - SELECT TESTS ORDERED
Blood pressure currently acceptable  Current Rx: Amlodipine 5 mg daily  Continue current dose   Urinalysis/CBC/INR/Type and Screen/BMP/PT/PTT/EKG

## 2024-12-19 NOTE — PATIENT PROFILE ADULT - NSTOBACCONEVERSMOKERY/N_GEN_A
Cough, sore throat, flu like symptoms  Chest XR negative for pneumonia  Strep test is negative.   Resp screening negative for covid, influenza, RSV  Will treat for suspected atypical pneumonia  Start Azithromycin 250 mg oral tablet, take 2 tablets day 1, 1 tablet day 2-5   Rest, fluids, humidifier, chest rub  Mucinex DM 2 x daily  Benzonatate 3 x daily as needed for cough  Return to clinic if symptoms persist/worsen        
No
